# Patient Record
Sex: FEMALE | Race: WHITE | NOT HISPANIC OR LATINO | Employment: UNEMPLOYED | URBAN - METROPOLITAN AREA
[De-identification: names, ages, dates, MRNs, and addresses within clinical notes are randomized per-mention and may not be internally consistent; named-entity substitution may affect disease eponyms.]

---

## 2022-06-03 ENCOUNTER — TELEPHONE (OUTPATIENT)
Dept: BEHAVIORAL HEALTH | Facility: CLINIC | Age: 32
End: 2022-06-03

## 2022-06-03 ENCOUNTER — HOSPITAL ENCOUNTER (INPATIENT)
Facility: CLINIC | Age: 32
LOS: 2 days | Discharge: PSYCHIATRIC OR SUBSTANCE USE FACILITY | DRG: 897 | End: 2022-06-05
Attending: EMERGENCY MEDICINE | Admitting: PSYCHIATRY & NEUROLOGY
Payer: COMMERCIAL

## 2022-06-03 DIAGNOSIS — Z20.822 CONTACT WITH AND (SUSPECTED) EXPOSURE TO COVID-19: ICD-10-CM

## 2022-06-03 DIAGNOSIS — F33.0 MILD EPISODE OF RECURRENT MAJOR DEPRESSIVE DISORDER (H): Primary | ICD-10-CM

## 2022-06-03 DIAGNOSIS — F10.220 ACUTE ALCOHOLIC INTOXICATION IN ALCOHOLISM WITHOUT COMPLICATION (H): ICD-10-CM

## 2022-06-03 DIAGNOSIS — F10.239 ALCOHOL DEPENDENCE WITH WITHDRAWAL WITH COMPLICATION (H): ICD-10-CM

## 2022-06-03 LAB
ALBUMIN SERPL-MCNC: 3.5 G/DL (ref 3.4–5)
ALCOHOL BREATH TEST: 0.41 (ref 0–0.01)
ALP SERPL-CCNC: 72 U/L (ref 40–150)
ALT SERPL W P-5'-P-CCNC: 23 U/L (ref 0–50)
AMPHETAMINES UR QL SCN: NORMAL
ANION GAP SERPL CALCULATED.3IONS-SCNC: 9 MMOL/L (ref 3–14)
AST SERPL W P-5'-P-CCNC: 19 U/L (ref 0–45)
BARBITURATES UR QL: NORMAL
BASOPHILS # BLD AUTO: 0.1 10E3/UL (ref 0–0.2)
BASOPHILS NFR BLD AUTO: 1 %
BENZODIAZ UR QL: NORMAL
BILIRUB SERPL-MCNC: 0.1 MG/DL (ref 0.2–1.3)
BUN SERPL-MCNC: 8 MG/DL (ref 7–30)
CALCIUM SERPL-MCNC: 7.9 MG/DL (ref 8.5–10.1)
CANNABINOIDS UR QL SCN: NORMAL
CHLORIDE BLD-SCNC: 108 MMOL/L (ref 94–109)
CO2 SERPL-SCNC: 26 MMOL/L (ref 20–32)
COCAINE UR QL: NORMAL
CREAT SERPL-MCNC: 0.47 MG/DL (ref 0.52–1.04)
EOSINOPHIL # BLD AUTO: 0 10E3/UL (ref 0–0.7)
EOSINOPHIL NFR BLD AUTO: 0 %
ERYTHROCYTE [DISTWIDTH] IN BLOOD BY AUTOMATED COUNT: 11.9 % (ref 10–15)
GFR SERPL CREATININE-BSD FRML MDRD: >90 ML/MIN/1.73M2
GLUCOSE BLD-MCNC: 124 MG/DL (ref 70–99)
HCG SERPL QL: NEGATIVE
HCG UR QL: NEGATIVE
HCT VFR BLD AUTO: 35.7 % (ref 35–47)
HGB BLD-MCNC: 12.4 G/DL (ref 11.7–15.7)
IMM GRANULOCYTES # BLD: 0 10E3/UL
IMM GRANULOCYTES NFR BLD: 1 %
LYMPHOCYTES # BLD AUTO: 1.8 10E3/UL (ref 0.8–5.3)
LYMPHOCYTES NFR BLD AUTO: 21 %
MCH RBC QN AUTO: 31.4 PG (ref 26.5–33)
MCHC RBC AUTO-ENTMCNC: 34.7 G/DL (ref 31.5–36.5)
MCV RBC AUTO: 90 FL (ref 78–100)
MONOCYTES # BLD AUTO: 0.4 10E3/UL (ref 0–1.3)
MONOCYTES NFR BLD AUTO: 5 %
NEUTROPHILS # BLD AUTO: 6.4 10E3/UL (ref 1.6–8.3)
NEUTROPHILS NFR BLD AUTO: 72 %
NRBC # BLD AUTO: 0 10E3/UL
NRBC BLD AUTO-RTO: 0 /100
OPIATES UR QL SCN: NORMAL
PLATELET # BLD AUTO: 307 10E3/UL (ref 150–450)
POTASSIUM BLD-SCNC: 2.9 MMOL/L (ref 3.4–5.3)
PROT SERPL-MCNC: 6.9 G/DL (ref 6.8–8.8)
RBC # BLD AUTO: 3.95 10E6/UL (ref 3.8–5.2)
SARS-COV-2 RNA RESP QL NAA+PROBE: NEGATIVE
SODIUM SERPL-SCNC: 143 MMOL/L (ref 133–144)
WBC # BLD AUTO: 8.8 10E3/UL (ref 4–11)

## 2022-06-03 PROCEDURE — 250N000013 HC RX MED GY IP 250 OP 250 PS 637: Performed by: EMERGENCY MEDICINE

## 2022-06-03 PROCEDURE — 87635 SARS-COV-2 COVID-19 AMP PRB: CPT | Performed by: EMERGENCY MEDICINE

## 2022-06-03 PROCEDURE — 84703 CHORIONIC GONADOTROPIN ASSAY: CPT | Performed by: EMERGENCY MEDICINE

## 2022-06-03 PROCEDURE — C9803 HOPD COVID-19 SPEC COLLECT: HCPCS | Performed by: EMERGENCY MEDICINE

## 2022-06-03 PROCEDURE — 80307 DRUG TEST PRSMV CHEM ANLYZR: CPT | Performed by: EMERGENCY MEDICINE

## 2022-06-03 PROCEDURE — 128N000004 HC R&B CD ADULT

## 2022-06-03 PROCEDURE — 82075 ASSAY OF BREATH ETHANOL: CPT | Performed by: EMERGENCY MEDICINE

## 2022-06-03 PROCEDURE — 99284 EMERGENCY DEPT VISIT MOD MDM: CPT | Performed by: EMERGENCY MEDICINE

## 2022-06-03 PROCEDURE — 250N000013 HC RX MED GY IP 250 OP 250 PS 637: Performed by: PSYCHIATRY & NEUROLOGY

## 2022-06-03 PROCEDURE — 80053 COMPREHEN METABOLIC PANEL: CPT | Performed by: EMERGENCY MEDICINE

## 2022-06-03 PROCEDURE — 99285 EMERGENCY DEPT VISIT HI MDM: CPT | Performed by: EMERGENCY MEDICINE

## 2022-06-03 PROCEDURE — HZ2ZZZZ DETOXIFICATION SERVICES FOR SUBSTANCE ABUSE TREATMENT: ICD-10-PCS | Performed by: EMERGENCY MEDICINE

## 2022-06-03 PROCEDURE — 36415 COLL VENOUS BLD VENIPUNCTURE: CPT | Performed by: EMERGENCY MEDICINE

## 2022-06-03 PROCEDURE — 85025 COMPLETE CBC W/AUTO DIFF WBC: CPT | Performed by: EMERGENCY MEDICINE

## 2022-06-03 PROCEDURE — 81025 URINE PREGNANCY TEST: CPT | Performed by: EMERGENCY MEDICINE

## 2022-06-03 RX ORDER — ACETAMINOPHEN 325 MG/1
650 TABLET ORAL EVERY 4 HOURS PRN
Status: DISCONTINUED | OUTPATIENT
Start: 2022-06-03 | End: 2022-06-05 | Stop reason: HOSPADM

## 2022-06-03 RX ORDER — BUPROPION HYDROCHLORIDE 150 MG/1
1 TABLET ORAL DAILY
COMMUNITY
Start: 2022-04-29

## 2022-06-03 RX ORDER — ONDANSETRON 4 MG/1
4 TABLET, ORALLY DISINTEGRATING ORAL EVERY 6 HOURS PRN
Status: DISCONTINUED | OUTPATIENT
Start: 2022-06-03 | End: 2022-06-05 | Stop reason: HOSPADM

## 2022-06-03 RX ORDER — HYDROXYZINE HYDROCHLORIDE 25 MG/1
25 TABLET, FILM COATED ORAL EVERY 4 HOURS PRN
Status: DISCONTINUED | OUTPATIENT
Start: 2022-06-03 | End: 2022-06-05 | Stop reason: HOSPADM

## 2022-06-03 RX ORDER — LOPERAMIDE HCL 2 MG
2 CAPSULE ORAL 4 TIMES DAILY PRN
Status: DISCONTINUED | OUTPATIENT
Start: 2022-06-03 | End: 2022-06-05 | Stop reason: HOSPADM

## 2022-06-03 RX ORDER — FOLIC ACID 1 MG/1
1 TABLET ORAL DAILY
Status: DISCONTINUED | OUTPATIENT
Start: 2022-06-04 | End: 2022-06-04

## 2022-06-03 RX ORDER — LORAZEPAM 1 MG/1
1-4 TABLET ORAL EVERY 30 MIN PRN
Status: DISCONTINUED | OUTPATIENT
Start: 2022-06-03 | End: 2022-06-03

## 2022-06-03 RX ORDER — DIAZEPAM 5 MG
5-20 TABLET ORAL EVERY 30 MIN PRN
Status: DISCONTINUED | OUTPATIENT
Start: 2022-06-03 | End: 2022-06-05 | Stop reason: HOSPADM

## 2022-06-03 RX ORDER — MAGNESIUM HYDROXIDE/ALUMINUM HYDROXICE/SIMETHICONE 120; 1200; 1200 MG/30ML; MG/30ML; MG/30ML
30 SUSPENSION ORAL EVERY 4 HOURS PRN
Status: DISCONTINUED | OUTPATIENT
Start: 2022-06-03 | End: 2022-06-05 | Stop reason: HOSPADM

## 2022-06-03 RX ORDER — MULTIPLE VITAMINS W/ MINERALS TAB 9MG-400MCG
1 TAB ORAL DAILY
Status: DISCONTINUED | OUTPATIENT
Start: 2022-06-04 | End: 2022-06-04

## 2022-06-03 RX ORDER — POTASSIUM CHLORIDE 1.5 G/1.58G
60 POWDER, FOR SOLUTION ORAL ONCE
Status: COMPLETED | OUTPATIENT
Start: 2022-06-03 | End: 2022-06-03

## 2022-06-03 RX ORDER — ATENOLOL 50 MG/1
50 TABLET ORAL DAILY PRN
Status: DISCONTINUED | OUTPATIENT
Start: 2022-06-03 | End: 2022-06-05 | Stop reason: HOSPADM

## 2022-06-03 RX ORDER — MULTIPLE VITAMINS W/ MINERALS TAB 9MG-400MCG
1 TAB ORAL DAILY
Status: DISCONTINUED | OUTPATIENT
Start: 2022-06-03 | End: 2022-06-05 | Stop reason: HOSPADM

## 2022-06-03 RX ORDER — DOXEPIN 6 MG/1
TABLET, FILM COATED ORAL
Status: ON HOLD | COMMUNITY
Start: 2022-05-05 | End: 2022-06-05

## 2022-06-03 RX ORDER — TRAZODONE HYDROCHLORIDE 50 MG/1
50 TABLET, FILM COATED ORAL
Status: DISCONTINUED | OUTPATIENT
Start: 2022-06-03 | End: 2022-06-05 | Stop reason: HOSPADM

## 2022-06-03 RX ORDER — FOLIC ACID 1 MG/1
1 TABLET ORAL DAILY
Status: DISCONTINUED | OUTPATIENT
Start: 2022-06-03 | End: 2022-06-05 | Stop reason: HOSPADM

## 2022-06-03 RX ORDER — AMOXICILLIN 250 MG
1 CAPSULE ORAL 2 TIMES DAILY PRN
Status: DISCONTINUED | OUTPATIENT
Start: 2022-06-03 | End: 2022-06-05 | Stop reason: HOSPADM

## 2022-06-03 RX ADMIN — FOLIC ACID 1 MG: 1 TABLET ORAL at 16:21

## 2022-06-03 RX ADMIN — DIAZEPAM 10 MG: 5 TABLET ORAL at 22:04

## 2022-06-03 RX ADMIN — MULTIPLE VITAMINS W/ MINERALS TAB 1 TABLET: TAB at 16:21

## 2022-06-03 RX ADMIN — THIAMINE HCL TAB 100 MG 100 MG: 100 TAB at 16:21

## 2022-06-03 RX ADMIN — POTASSIUM CHLORIDE 60 MEQ: 1.5 POWDER, FOR SOLUTION ORAL at 17:48

## 2022-06-03 RX ADMIN — LORAZEPAM 1 MG: 1 TABLET ORAL at 19:13

## 2022-06-03 RX ADMIN — LORAZEPAM 1 MG: 1 TABLET ORAL at 16:33

## 2022-06-03 ASSESSMENT — ENCOUNTER SYMPTOMS
FLANK PAIN: 0
NAUSEA: 0
FEVER: 0
MUSCULOSKELETAL NEGATIVE: 1
SHORTNESS OF BREATH: 0
COUGH: 0
PSYCHIATRIC NEGATIVE: 1
CHILLS: 0
HEADACHES: 0
ABDOMINAL PAIN: 0
EYES NEGATIVE: 1
VOMITING: 0

## 2022-06-03 ASSESSMENT — ACTIVITIES OF DAILY LIVING (ADL)
LAUNDRY: WITH SUPERVISION
HYGIENE/GROOMING: INDEPENDENT
DRESS: INDEPENDENT
ORAL_HYGIENE: INDEPENDENT

## 2022-06-03 ASSESSMENT — PATIENT HEALTH QUESTIONNAIRE - PHQ9: SUM OF ALL RESPONSES TO PHQ9 QUESTIONS 1 & 2: 5

## 2022-06-03 NOTE — ED TRIAGE NOTES
Pt is looking to go to the [facility], which is similar to a rehab facility but pt needs to be medically cleared and needs to be detox'ed from alcohol prior to being able to get into the [facility] facility. Pt denies any withdrawal seizures. Denies taking any medications.     Triage Assessment     Row Name 06/03/22 4058       Triage Assessment (Adult)    Airway WDL WDL       Respiratory WDL    Respiratory WDL WDL       Skin Circulation/Temperature WDL    Skin Circulation/Temperature WDL WDL       Cardiac WDL    Cardiac WDL WDL       Peripheral/Neurovascular WDL    Peripheral Neurovascular WDL WDL       Cognitive/Neuro/Behavioral WDL    Cognitive/Neuro/Behavioral WDL WDL

## 2022-06-04 LAB
CHOLEST SERPL-MCNC: 191 MG/DL
GGT SERPL-CCNC: 17 U/L (ref 0–40)
HDLC SERPL-MCNC: 62 MG/DL
LDLC SERPL CALC-MCNC: 100 MG/DL
NONHDLC SERPL-MCNC: 129 MG/DL
POTASSIUM BLD-SCNC: 3.2 MMOL/L (ref 3.4–5.3)
TRIGL SERPL-MCNC: 146 MG/DL
TSH SERPL DL<=0.005 MIU/L-ACNC: 2.67 MU/L (ref 0.4–4)

## 2022-06-04 PROCEDURE — 250N000013 HC RX MED GY IP 250 OP 250 PS 637: Performed by: EMERGENCY MEDICINE

## 2022-06-04 PROCEDURE — 250N000013 HC RX MED GY IP 250 OP 250 PS 637: Performed by: PHYSICIAN ASSISTANT

## 2022-06-04 PROCEDURE — 36415 COLL VENOUS BLD VENIPUNCTURE: CPT | Performed by: PSYCHIATRY & NEUROLOGY

## 2022-06-04 PROCEDURE — 128N000004 HC R&B CD ADULT

## 2022-06-04 PROCEDURE — 82977 ASSAY OF GGT: CPT | Performed by: PSYCHIATRY & NEUROLOGY

## 2022-06-04 PROCEDURE — 80061 LIPID PANEL: CPT | Performed by: PSYCHIATRY & NEUROLOGY

## 2022-06-04 PROCEDURE — 99231 SBSQ HOSP IP/OBS SF/LOW 25: CPT | Performed by: PHYSICIAN ASSISTANT

## 2022-06-04 PROCEDURE — H2032 ACTIVITY THERAPY, PER 15 MIN: HCPCS

## 2022-06-04 PROCEDURE — 84132 ASSAY OF SERUM POTASSIUM: CPT | Performed by: PSYCHIATRY & NEUROLOGY

## 2022-06-04 PROCEDURE — 250N000013 HC RX MED GY IP 250 OP 250 PS 637: Performed by: PSYCHIATRY & NEUROLOGY

## 2022-06-04 PROCEDURE — 99223 1ST HOSP IP/OBS HIGH 75: CPT | Mod: AI | Performed by: PSYCHIATRY & NEUROLOGY

## 2022-06-04 PROCEDURE — 84443 ASSAY THYROID STIM HORMONE: CPT | Performed by: PSYCHIATRY & NEUROLOGY

## 2022-06-04 RX ORDER — POTASSIUM CHLORIDE 1.5 G/1.58G
40 POWDER, FOR SOLUTION ORAL ONCE
Status: COMPLETED | OUTPATIENT
Start: 2022-06-04 | End: 2022-06-04

## 2022-06-04 RX ORDER — DOXEPIN 3 MG/1
6 TABLET, FILM COATED ORAL AT BEDTIME
Status: DISCONTINUED | OUTPATIENT
Start: 2022-06-04 | End: 2022-06-05 | Stop reason: HOSPADM

## 2022-06-04 RX ORDER — POTASSIUM CHLORIDE 1.5 G/1.58G
60 POWDER, FOR SOLUTION ORAL ONCE
Status: DISCONTINUED | OUTPATIENT
Start: 2022-06-04 | End: 2022-06-04

## 2022-06-04 RX ORDER — DOXEPIN 6 MG/1
6 TABLET, FILM COATED ORAL AT BEDTIME
Status: DISCONTINUED | OUTPATIENT
Start: 2022-06-04 | End: 2022-06-04

## 2022-06-04 RX ORDER — BUPROPION HYDROCHLORIDE 150 MG/1
150 TABLET ORAL DAILY
Status: DISCONTINUED | OUTPATIENT
Start: 2022-06-04 | End: 2022-06-05

## 2022-06-04 RX ADMIN — MULTIPLE VITAMINS W/ MINERALS TAB 1 TABLET: TAB at 12:06

## 2022-06-04 RX ADMIN — POTASSIUM CHLORIDE 40 MEQ: 1.5 FOR SOLUTION ORAL at 13:06

## 2022-06-04 RX ADMIN — FOLIC ACID 1 MG: 1 TABLET ORAL at 12:06

## 2022-06-04 RX ADMIN — DIAZEPAM 10 MG: 5 TABLET ORAL at 00:38

## 2022-06-04 RX ADMIN — DOXEPIN HYDROCHLORIDE 6 MG: 3 TABLET ORAL at 21:49

## 2022-06-04 RX ADMIN — SERTRALINE HYDROCHLORIDE 50 MG: 50 TABLET ORAL at 12:06

## 2022-06-04 RX ADMIN — THIAMINE HCL TAB 100 MG 100 MG: 100 TAB at 12:06

## 2022-06-04 ASSESSMENT — ACTIVITIES OF DAILY LIVING (ADL)
HYGIENE/GROOMING: INDEPENDENT
ORAL_HYGIENE: INDEPENDENT
LAUNDRY: WITH SUPERVISION
DRESS: INDEPENDENT

## 2022-06-04 NOTE — PLAN OF CARE
06/04/22 1800   Group Therapy Session   Group Attendance attended group session   Time Session Began 1645   Time Session Ended 1730   Total Time patient participated (minutes) 45   Total # Attendees 7   Group Type expressive therapy  (art therapy)   Group Topic Covered structured socialization   Group Session Detail collaborative art   Patient Response/Contribution cooperative with task;organized   Patient Response Detail Kodi presented as calm and cooperative. She engaged in group discussion about social engagement and community. She participated in making collaborative artwork and contributed positive imagery to peers' art. She interacted appropriately with peers, sharing supplies and using humor.   Goal Outcome Evaluation:

## 2022-06-04 NOTE — CONSULTS
"[facility]  Consult Note - Hospitalist Service  Date of Admission:  6/3/2022  Consult Requested by: Evan Multani MD  Reason for Consult: Co-management of alcohol withdrawal    Assessment & Plan   Kodi Dumont is a 32 year old female admitted on 6/3/2022. She has a PMH significant for alcohol use disorder. She denies a history of other medical problems.     Per ED note 6/3/22: Patient reports that she went to \"[facility] facility today for detox from alcohol but was told to come to the ED today to detox.  She states that she has been drinking about 7 \"nips\" a day.  She says that her last drink was in the bathroom here in the ED.  She adds that she used to live in a sober living facility but no longer lives there anymore.  Patient denies suicidal ideation.  Patient denies history of alcohol withdrawal seizures.  She denies other substance use.  Social: She used to live in sober living.      Acute alcohol withdrawal  Alcohol use disorder  -Treatment per primary team    Hypokalemia, mild  Likely 2/2 EtOH use. K 2.9 on admission, improved to 3.2 this AM after 60mEq KCl given in ED.  -KCl 40mEq PO x1  -No need to recheck/monitor further.       The patient's care was discussed with the Patient.    Primary team will be notified of the above via this note. Medicine will sign off at this time.     Thank you for involving the hospital medicine service in the care of this patient. Please do not hesitate to contact us with an questions or concerns.      JENS NEWMAN PA  [facility]  Securely message with the Vocera Web Console (learn more here)  Text page via [email or web address] Paging/Directory       Hospitalist Service    Clinically Significant Risk Factors Present on Admission          # Hypocalcemia: Ca = 7.9 mg/dL (Ref range: 8.5 - 10.1 mg/dL) and/or iCa = N/A on admission, will replace as needed         " "    ______________________________________________________________________    Chief Complaint   Alcohol withdrawal    History is obtained from the patient and electronic health record    History of Present Illness   Kodi Dumont is a 32 year old female who has a PMH significant for alcohol use disorder. She denies a history of other medical problems. She denies other problems or concerns at this time. She has no questions.    Per ED note 6/3/22: Patient reports that she went to \"[facility]\" facility today for detox from alcohol but was told to come to the ED today to detox.  She states that she has been drinking about 7 \"nips\" a day.  She says that her last drink was in the bathroom here in the ED.  She adds that she used to live in a sober living facility but no longer lives there anymore.  Patient denies suicidal ideation.  Patient denies history of alcohol withdrawal seizures.  She denies other substance use.  Social: She used to live in sober living.      Review of Systems   The 10 point Review of Systems is negative other than noted in the HPI or here.     Past Medical History    I have reviewed this patient's medical history and updated it with pertinent information if needed.   History reviewed. No pertinent past medical history.    Past Surgical History   I have reviewed this patient's surgical history and updated it with pertinent information if needed.  History reviewed. No pertinent surgical history.    Social History   I have reviewed this patient's social history and updated it with pertinent information if needed.  Social History     Tobacco Use     Smoking status: Never Smoker     Smokeless tobacco: Never Used   Substance Use Topics     Alcohol use: Yes     Comment: 5 shots of vodka daily     Drug use: Never       Family History         Medications   Medications Prior to Admission   Medication Sig Dispense Refill Last Dose     buPROPion (WELLBUTRIN XL) 150 MG 24 hr tablet Take 1 tablet by mouth " daily   Past Week at 1.5 weeks ago     doxepin (SILENOR) 6 MG tablet TAKE 1/2 TO 1 TABLETS BY MOUTH DAILY AT BEDTIME   Past Week at 1.5 weeks ago     sertraline (ZOLOFT) 50 MG tablet Take 1 tablet by mouth daily   Past Week at 1.5 weeks ago       Allergies   No Known Allergies    Physical Exam   Vital Signs: Temp: 97.6  F (36.4  C) Temp src: Temporal BP: 111/76 Pulse: 73   Resp: 16 SpO2: 98 % O2 Device: None (Room air)    Weight: 105 lbs 0 oz    General: Nontoxic appearance, no acute distress   Eyes: PERRL, EOMI, sclerae nonicteric   Respiratory: Clear throughout, no respiratory distress   Cardiovascular: RRR, no murmurs  Skin: Warm, dry, no pallor, no rash   Musculoskeletal: Moves all extremities equally  Neurologic: Alert and oriented x 4, CN II-XII observed to be grossly intact, no focal weakness, speech normal, gait steady  Psychiatric: Blunt affect. Thoughts logical.      Data   Results for orders placed or performed during the hospital encounter of 06/03/22 (from the past 24 hour(s))   Alcohol breath test POCT   Result Value Ref Range    Alcohol Breath Test 0.412 (A) 0.00 - 0.01   Urine Drugs of Abuse Screen    Narrative    The following orders were created for panel order Urine Drugs of Abuse Screen.  Procedure                               Abnormality         Status                     ---------                               -----------         ------                     Drug abuse screen 1 urin...[[id number]]  Normal              Final result                 Please view results for these tests on the individual orders.   HCG qualitative urine   Result Value Ref Range    hCG Urine Qualitative Negative Negative   Drug abuse screen 1 urine (ED)   Result Value Ref Range    Amphetamines Urine Screen Negative Screen Negative    Barbiturates Urine Screen Negative Screen Negative    Benzodiazepines Urine Screen Negative Screen Negative    Cannabinoids Urine Screen Negative Screen Negative    Cocaine Urine Screen  Negative Screen Negative    Opiates Urine Screen Negative Screen Negative   CBC with platelets differential    Narrative    The following orders were created for panel order CBC with platelets differential.  Procedure                               Abnormality         Status                     ---------                               -----------         ------                     CBC with platelets and d...[[id number]]                      Final result                 Please view results for these tests on the individual orders.   Comprehensive metabolic panel   Result Value Ref Range    Sodium 143 133 - 144 mmol/L    Potassium 2.9 (L) 3.4 - 5.3 mmol/L    Chloride 108 94 - 109 mmol/L    Carbon Dioxide (CO2) 26 20 - 32 mmol/L    Anion Gap 9 3 - 14 mmol/L    Urea Nitrogen 8 7 - 30 mg/dL    Creatinine 0.47 (L) 0.52 - 1.04 mg/dL    Calcium 7.9 (L) 8.5 - 10.1 mg/dL    Glucose 124 (H) 70 - 99 mg/dL    Alkaline Phosphatase 72 40 - 150 U/L    AST 19 0 - 45 U/L    ALT 23 0 - 50 U/L    Protein Total 6.9 6.8 - 8.8 g/dL    Albumin 3.5 3.4 - 5.0 g/dL    Bilirubin Total 0.1 (L) 0.2 - 1.3 mg/dL    GFR Estimate >90 >60 mL/min/1.73m2   HCG qualitative pregnancy (blood)   Result Value Ref Range    hCG Serum Qualitative Negative Negative   CBC with platelets and differential   Result Value Ref Range    WBC Count 8.8 4.0 - 11.0 10e3/uL    RBC Count 3.95 3.80 - 5.20 10e6/uL    Hemoglobin 12.4 11.7 - 15.7 g/dL    Hematocrit 35.7 35.0 - 47.0 %    MCV 90 78 - 100 fL    MCH 31.4 26.5 - 33.0 pg    MCHC 34.7 31.5 - 36.5 g/dL    RDW 11.9 10.0 - 15.0 %    Platelet Count 307 150 - 450 10e3/uL    % Neutrophils 72 %    % Lymphocytes 21 %    % Monocytes 5 %    % Eosinophils 0 %    % Basophils 1 %    % Immature Granulocytes 1 %    NRBCs per 100 WBC 0 <1 /100    Absolute Neutrophils 6.4 1.6 - 8.3 10e3/uL    Absolute Lymphocytes 1.8 0.8 - 5.3 10e3/uL    Absolute Monocytes 0.4 0.0 - 1.3 10e3/uL    Absolute Eosinophils 0.0 0.0 - 0.7 10e3/uL    Absolute  Basophils 0.1 0.0 - 0.2 10e3/uL    Absolute Immature Granulocytes 0.0 <=0.4 10e3/uL    Absolute NRBCs 0.0 10e3/uL   Asymptomatic COVID-19 Virus (Coronavirus) by PCR Nose    Specimen: Nose; Swab   Result Value Ref Range    SARS CoV2 PCR Negative Negative    Narrative    Testing was performed using the ya  SARS-CoV-2 & Influenza A/B Assay on the ya  [employer]  System.  This test should be ordered for the detection of SARS-COV-2 in individuals who meet SARS-CoV-2 clinical and/or epidemiological criteria. Test performance is unknown in asymptomatic patients.  This test is for in vitro diagnostic use under the FDA EUA for laboratories certified under CLIA to perform moderate and/or high complexity testing. This test has not been FDA cleared or approved.  A negative test does not rule out the presence of PCR inhibitors in the specimen or target RNA in concentration below the limit of detection for the assay. The possibility of a false negative should be considered if the patient's recent exposure or clinical presentation suggests COVID-19.  [facility] are certified under the Clinical Laboratory Improvement Amendments of 1988 (CLIA-88) as qualified to perform moderate and/or high complexity laboratory testing.   GGT   Result Value Ref Range    GGT 17 0 - 40 U/L   Lipid panel   Result Value Ref Range    Cholesterol 191 <200 mg/dL    Triglycerides 146 <150 mg/dL    Direct Measure HDL 62 >=50 mg/dL    LDL Cholesterol Calculated 100 <=100 mg/dL    Non HDL Cholesterol 129 <130 mg/dL    Narrative    Cholesterol  Desirable:  <200 mg/dL    Triglycerides  Normal:  Less than 150 mg/dL  Borderline High:  150-199 mg/dL  High:  200-499 mg/dL  Very High:  Greater than or equal to 500 mg/dL    Direct Measure HDL  Female:  Greater than or equal to 50 mg/dL   Male:  Greater than or equal to 40 mg/dL    LDL Cholesterol  Desirable:  <100mg/dL  Above Desirable:  100-129 mg/dL   Borderline High:  130-159 mg/dL   High:   160-189 mg/dL   Very High:  >= 190 mg/dL    Non HDL Cholesterol  Desirable:  130 mg/dL  Above Desirable:  130-159 mg/dL  Borderline High:  160-189 mg/dL  High:  190-219 mg/dL  Very High:  Greater than or equal to 220 mg/dL   TSH with free T4 reflex and/or T3 as indicated   Result Value Ref Range    TSH 2.67 0.40 - 4.00 mU/L   Potassium   Result Value Ref Range    Potassium 3.2 (L) 3.4 - 5.3 mmol/L

## 2022-06-04 NOTE — TELEPHONE ENCOUNTER
S: 6:54pm- ED MD called to request detox inpt for 32 yrs old female in the [facility] ED.     B: Pt presents to the ED seeking detox from alcohol.  Pt reports drinking 7 nips a day/10 shots of vodka daily for the last month.  Pt denies a hx of w/d seizures and DTs.  Pt denies SI.  Pt is getting Ativan and is on MSSA protocol.  Pt went to  [facility]ility today and was told she needs to go to detox.  Pt s last drink was in the bathroom in the ER.  She denies other substance use.  She is not taking any medications.     Pt denies any chronic medical illness.  She is ambulatory and is medically cleared.     COVID: negative  UTOX: negative  CMP: Potassium 2.9, replaced in ED.  CBC: WNL  HCG: negative  BREATHALYZER: 0.412 @ 1:57pm.     A: Vol.    R: Patient cleared and ready for behavioral bed placement: Yes     8pm- Dr. Multani accepts for 3A/[facility].  CD Admit.

## 2022-06-04 NOTE — PLAN OF CARE
Problem: Plan of Care - These are the overarching goals to be used throughout the patient stay.    Goal: Optimal Comfort and Wellbeing  Outcome: Ongoing, Progressing     Problem: Behavioral Health Plan of Care  Goal: Optimal Comfort and Wellbeing  Outcome: Ongoing, Progressing    Behavioral  Pt appeared sleeping comfortably overnight; breathing was even and unlabored.      Medical  Pt continues in alcohol withdrawal; MSSA 8 and 4; 10 x 1 of valium given this shift; Pt has received a total of 20 mg of valium and 2 mg of ativan since arrival to the hospital. Pt last valium 6/4 at 0038.  No new medical concerns noted.

## 2022-06-04 NOTE — ED NOTES
ED to Behavioral Floor Handoff    SITUATION  Kodi Dumont is a 32 year old female who speaks English and lives home status is unknown unknown The patient arrived in the ED by private car from sober facility with a complaint of Alcohol Intoxication (Looking for detox, 10 shots of vodka daily. Last drink 1.5 hours ago. Denies any withdrawal seizures.)  .The patient's current symptoms started/worsened 1 day(s) ago and during this time the symptoms have remained the same.   In the ED, pt was diagnosed with   Final diagnoses:   Acute alcoholic intoxication in alcoholism without complication (H)        Initial vitals were: BP: 117/78  Pulse: 101  Temp: 98.4  F (36.9  C)  Resp: 16  Height: 152.4 cm (5')  Weight: 47.6 kg (105 lb)  SpO2: 95 %   --------  Is the patient diabetic? No   If yes, last blood glucose? --     If yes, was this treated in the ED? --  --------  Is the patient inebriated (ETOH) Yes or Impaired on other substances? No  MSSA done? Yes  Last MSSA score: --    Were withdrawal symptoms treated? Yes  Does the patient have a seizure history? Yes. If yes, date of most recent seizure--  --------  Is the patient patient experiencing suicidal ideation? denies current or recent suicidal ideation     Homicidal ideation? denies current or recent homicidal ideation or behaviors.    Self-injurious behavior/urges? denies current or recent self injurious behavior or ideation.  ------  Was pt aggressive in the ED No  Was a code called No  Is the pt now cooperative? Yes  -------  Meds given in ED:   Medications   LORazepam (ATIVAN) tablet 1-4 mg (1 mg Oral Given 6/3/22 1913)   thiamine (B-1) tablet 100 mg (100 mg Oral Given 6/3/22 1621)   folic acid (FOLVITE) tablet 1 mg (1 mg Oral Given 6/3/22 1621)   multivitamin w/minerals (THERA-VIT-M) tablet 1 tablet (1 tablet Oral Given 6/3/22 1621)   potassium chloride (KLOR-CON) Packet 60 mEq (60 mEq Oral Given 6/3/22 1748)      Family present during ED course? No  Family  currently present? No    BACKGROUND  Does the patient have a cognitive impairment or developmental disability? No  Allergies: No Known Allergies.   Social demographics are   Social History     Socioeconomic History     Marital status: Single     Spouse name: None     Number of children: None     Years of education: None     Highest education level: None   Tobacco Use     Smoking status: Never Smoker     Smokeless tobacco: Never Used   Substance and Sexual Activity     Alcohol use: Yes     Comment: 5 shots of vodka daily     Drug use: Never     Sexual activity: Not Currently        ASSESSMENT  Labs results   Labs Ordered and Resulted from Time of ED Arrival to Time of ED Departure   COMPREHENSIVE METABOLIC PANEL - Abnormal       Result Value    Sodium 143      Potassium 2.9 (*)     Chloride 108      Carbon Dioxide (CO2) 26      Anion Gap 9      Urea Nitrogen 8      Creatinine 0.47 (*)     Calcium 7.9 (*)     Glucose 124 (*)     Alkaline Phosphatase 72      AST 19      ALT 23      Protein Total 6.9      Albumin 3.5      Bilirubin Total 0.1 (*)     GFR Estimate >90     ALCOHOL BREATH TEST POCT - Abnormal    Alcohol Breath Test 0.412 (*)    HCG QUALITATIVE URINE - Normal    hCG Urine Qualitative Negative     HCG QUALITATIVE PREGNANCY - Normal    hCG Serum Qualitative Negative     COVID-19 VIRUS (CORONAVIRUS) BY PCR - Normal    SARS CoV2 PCR Negative     DRUG ABUSE SCREEN 1 URINE (ED) - Normal    Amphetamines Urine Screen Negative      Barbiturates Urine Screen Negative      Benzodiazepines Urine Screen Negative      Cannabinoids Urine Screen Negative      Cocaine Urine Screen Negative      Opiates Urine Screen Negative     CBC WITH PLATELETS AND DIFFERENTIAL    WBC Count 8.8      RBC Count 3.95      Hemoglobin 12.4      Hematocrit 35.7      MCV 90      MCH 31.4      MCHC 34.7      RDW 11.9      Platelet Count 307      % Neutrophils 72      % Lymphocytes 21      % Monocytes 5      % Eosinophils 0      % Basophils 1       % Immature Granulocytes 1      NRBCs per 100 WBC 0      Absolute Neutrophils 6.4      Absolute Lymphocytes 1.8      Absolute Monocytes 0.4      Absolute Eosinophils 0.0      Absolute Basophils 0.1      Absolute Immature Granulocytes 0.0      Absolute NRBCs 0.0        Imaging Studies: No results found for this or any previous visit (from the past 24 hour(s)).   Most recent vital signs /83 (BP Location: Left arm)   Pulse 102   Temp 98.4  F (36.9  C) (Oral)   Resp 16   Ht 1.524 m (5')   Wt 47.6 kg (105 lb)   LMP 05/15/2022   SpO2 94%   BMI 20.51 kg/m     Abnormal labs/tests/findings requiring intervention:---   Pain control: pt had none  Nausea control: pt had none    RECOMMENDATION  Are any infection precautions needed (MRSA, VRE, etc.)? No If yes, what infection? --  ---  Does the patient have mobility issues? independently. If yes, what device does the pt use? ---  ---  Is patient on 72 hour hold or commitment? No If on 72 hour hold, have hold and rights been given to patient? No  Are admitting orders written if after 10 p.m. ?No  Tasks needing to be completed:---     Jen Boyce, LATOYA   ascom--    [phone] [facility]   [phone] [facility]

## 2022-06-04 NOTE — DISCHARGE INSTRUCTIONS
Behavioral Discharge Planning and Instructions  THANK YOU FOR CHOOSING THE [facility]  3A  [phone]    Summary: You were admitted to [facility] on 6/3/2022 for detoxification from alcohol.  A medical exam was performed that included lab work. You have met with a  and opted to return to [facility].  Please take care and make your recovery a priority, Kodi!    Recommendation:  Complete treatment and follow all recommendations of [facility].    Main Diagnosis: Per Dr. Ellis MD;    Alcohol Use Disorder, severe, in withdrawal, complicated by hypokalemia  Substance induced mood disorder  Generalized Anxiety Disorder    Major Treatments, Procedures and Findings:  You were detoxed from alcohol with the Modified Selective Severity Protocol using Valium. You have met with a  to develop a treatment plan for discharge.  You have had labs drawn and a copy of those labs will be sent home with you.  Please bring your lab results with to your follow up doctor appointment.    Symptoms to Report:  If you experience more anxiety, confusion, sleeplessness, deep sadness or thoughts of suicide, notify your treatment team or notify your primary care physician. IF ANY OF THE SYMPTOMS YOU ARE EXPERIENCING ARE A MEDICAL EMERGENCY CALL 911 IMMEDIATELY.     Lifestyle Adjustment: Adjust your lifestyle to get enough sleep, relaxation, exercise and  good nutrition. Continue to develop healthy coping skills to decrease stress and promote a sober living environment. Do not use alcohol, illegal drugs or addictive medications other than what is currently prescribed. AA, NA, and  Sponsor are excellent resources for support.     Primary Provider:  Please follow up with your primary care provider within 30 days of discharge.       DISCHARGE RESOURCES:  -SMART Recovery - self management for addiction recovery:  www.smartrecovery.org    -Pathways ~ A Health Crisis Resource & Support Center:  988.902.8609.  -[facility] [phone]   -[facility] [phone] or [phone].  -Suicide Awareness Voices of Education (SAVE) (www.save.org): 827-115-WPZG (4617)  -National Suicide Prevention Line (www.mentalhealthmn.org): 185-235-BJMD (3837)  -National [place] on Mental Illness (www.mn.ramon.org): 912.986.2998 or 701-306-5279.  -Mowd1ztwt: text the word LIFE to 52455 for immediate support and crisis intervention  -Mental Health Consumer/Survivor Network of [place] (www.mhcsn.net): 907.101.2810 or 713-233-6095  -Mental Health Association of [place] (www.mentalhealth.org): 350.666.4380 or 233-503-7954     -Substance Abuse and Mental Health Services (www.samhsa.gov)  -Harm Reduction Coalition (www. Harmreduction.org)  -www.prescribetoprevent.org or http://prescribetoprevent.org/video  -Poison control 8-213-506-1002   **[place] Opioid Prevention Coalition: www.opioidcoalition.org    Sober Support Group Information:  AA/BRAD & Sponsor/Support  -Alcoholics Anonymous (www.alcoholics-anonymous.org): for local information 24 hours/day  -AA Intergroup service office in [place] (http://www.aastpa[place].org/) 381.240.1481  -AA Intergroup service office in [facility]: 804.202.4012. (http://www.aamin[place].org/)  -Narcotics Anonymous (www.nam[place].org) (907) 715-8526   **Sober Fun Activities: www.sober-activities.[place].com/[facility]//[facility] Recovery Connection (MRC)  [facility] connects people seeking recovery to resources that help foster and sustain long-term recovery.  Whether you are seeking resources for treatment, transportation, housing, job training, education, health care or other pathways to recovery, [facility] is a great place to start.    Phone: 950.704.2902.  www.min[place]otarecovery.org (Great listing of all types of recovery and non-recovery related resources)      General Medication Instructions:   See your medication sheet(s) for instructions.   Take all  medicines as directed.  Make no changes unless your doctor suggests them.   Go to all your doctor visits.  Be sure to have all your required lab tests. This way, your medicines can be refilled on time.  Do not use any drugs not prescribed by your provider.  AA/NA and Sponsors are excellent resources for support  Avoid alcohol.    Any follow up concerns:  Nursing questions call the [facility] [phone]  Medical Record call [phone]  Outpatient Behavioral Intake call [phone]  LP+ Wait List/Bed Availability call [phone]    The entire treatment team has appreciated the opportunity to work with you Kodi.  We wish you the best in the future and with your lifelong recovery goals. Please bring this discharge folder with you to all follow up appointments.  It contains your lab results, diagnosis, medication list and discharge recommendations.    THANK YOU FOR CHOOSING THE [facility]

## 2022-06-04 NOTE — PLAN OF CARE
LALITHA Dumont is a 32 year old year old female with a chief complaint of alcohol problem        S = Situation:     Patient a voluntary admission seeking alcohol detox      B  = Background:   Patient admitted from the [facility] seeking alcohol detox. Patient reports drinking 7 small bottles of alcohol, plus 10 shots of vodka daily for 2.5 weeks. Patient denies any additional substance abuse. Patient breathalyzer 0.41 in the ER. Urine drug screen negative, urine pregnancy negative. Patient denies any history of alcohol withdrawal seizures or delirium tremens. Patient reports at least 15 detox admissions at various facilities. Patient denies any inpatient mental health admissions. Patient endorses attending 7 residential CD treatment programs. Patient reports a history of depression, anxiety, and insomnia.    A  =  Assessment:   Patient affect flat, blunted. Mood is anxious. Patient denies SI, SIB, HI, or hallucinations. Patient denies any stressors contributing to relapse. Patient currently resides in sober living by [facility], and plans to return to [facility] for CD treatment and housing. Patient MSSA score a 9 during admission assessment. Was given 10 mg of prn Valium. /82   Pulse 116   Temp 97.8  F (36.6  C) (Temporal)   Resp 16   Ht 1.524 m (5')   Wt 47.6 kg (105 lb)   LMP 05/15/2022   SpO2 95%   BMI 20.51 kg/m       R =   Request or Recommendation:   Patient is on MSSA monitoring for alcohol withdrawal with Valium. Is on withdrawal precautions. Patient to be assessed in am by psychiatrist and internal medicine in am.

## 2022-06-04 NOTE — H&P
Psychiatry History and Physical    Kodi Dumont MRN# [id number]   Age: 32 year old YOB: 1990     Date of Admission:  6/3/2022          Assessment:   This patient is a 32 year old  female with history of depression, anxiety, and alcohol use disorder who presented to ED seeking detox from alcohol. Family history significant for none per patient. Psychosocial stressors include: recent move to [place] for CD treatment, recent relapse, unemployment. Patient was admitted on a voluntary basis to [facility]. Patient was started on MSSA protocol using Valium. Pt  does not have a history of DTs or seizures. Thiamine, folic acid, and multivitamin were ordered on admission. IM consult placed for co-management of care. Symptoms and presentation at this time is most consistent with Alcohol Use Disorder, severe, in withdrawal, complicated by hypokalemia. I have discussed the risks, benefits, and alternatives of Valium and PTA medication regimen. Patient will likely benefit from increased CD supports upon discharge. CTC will be meeting with patient to discuss dispo plan. Will consider anti-craving medications prior to discharge. Inpatient psychiatric hospitalization is warranted at this time for safety, stabilization, and possible adjustment in medications.         Diagnoses:     Alcohol Use Disorder, severe, in withdrawal, complicated by hypokalemia  Substance induced mood disorder  Generalized Anxiety Disorder         Plan:   Psychiatric treatment/inteventions:  Alcohol Withdrawal:  - Continue MSSA protocol using Valium for management of alcohol withdrawal  - Continue thiamine, folate, and multivitamin daily  - Consider anti-craving medications prior to discharge. Pt willing to review additional information about anti-craving medications prior to discharge.  - Continue prn Zofran as needed for nausea   - Withdrawal precautions in place     reviewed    Depression/Anxiety:  - Hold Wellbutrin   "mg daily in context of alcohol withdrawal  - Continue hydroxyzine 25 mg q4h prn for acute anxiety  - Continue Trazodone 50 mg at bedtime prn for sleep disturbances     Patient will be treated in therapeutic milieu with appropriate individual and group therapies as described.    Medical treatment/interventions:  Medical concerns: Alcohol withdrawal  Labs: Reviewed.   Consults: Routine IM consult placed. Appreciate assistance.  Per IM Note dated 6/4/22:  Assessment & Plan     Kodi Dumont is a 32 year old female admitted on 6/3/2022. She has a PMH significant for alcohol use disorder. She denies a history of other medical problems.      Per ED note 6/3/22: Patient reports that she went to \"[facility] facility today for detox from alcohol but was told to come to the ED today to detox.  She states that she has been drinking about 7 \"nips\" a day.  She says that her last drink was in the bathroom here in the ED.  She adds that she used to live in a sober living facility but no longer lives there anymore.  Patient denies suicidal ideation.  Patient denies history of alcohol withdrawal seizures.  She denies other substance use.  Social: She used to live in sober living.     Acute alcohol withdrawal  Alcohol use disorder  -Treatment per primary team     Hypokalemia, mild  Likely 2/2 EtOH use. K 2.9 on admission, improved to 3.2 this AM after 60mEq KCl given in ED.  -KCl 40mEq PO x1  -No need to recheck/monitor further.     The patient's care was discussed with the Patient.     Primary team will be notified of the above via this note. Medicine will sign off at this time.     Thank you for involving the hospital medicine service in the care of this patient. Please do not hesitate to contact us with an questions or concerns.     JENS NEWMAN PA    Legal Status: Voluntary    Safety Assessment:   Checks: Status 15  Pt has not required locked seclusion or restraints in the past 24 hours to maintain safety, please " "refer to RN documentation for further details.    The risks, benefits, alternatives and side effects have been discussed and are understood by the patient.    Disposition Plan   Reason for ongoing admission: requires detoxification from substance that poses a risk of bodily harm during withdrawal period  Discharge location: D. Patient would benefit from increased CD supports.   Discharge Medications: not ordered  Follow-up Appointments: not scheduled  Anticipated length of stay: 2-3 days    Entered by: María Corral MD on 6/4/2022 at 8:45 AM              Chief Complaint:     Alcohol Detox         History of Present Illness:     Per ED Provider Note dated 6/3/22:    The history is provided by the patient and medical records.      Kodi Dumont is a 32 year old female with a past medical history significant for alcohol abuse who presents to the Emergency Department for evaluation of alcohol intoxication and request for detox..  The patient states that she is in alcohol withdrawal.     Patient reports that she went to \"[facility] facility today for detox from alcohol but was told to come to the ED today to detox.  She states that she has been drinking about 7 \"nips\" a day.  She says that her last drink was in the bathroom here in the ED.  She adds that she used to live in a sober living facility but no longer lives there anymore.  Patient denies suicidal ideation.  Patient denies history of alcohol withdrawal seizures.  She denies other substance use.  Social: She used to live in sober living.    have reviewed the nursing notes.  Emergency Department course:  The patient was seen and examined at 1407 pm, in [facility] [address].  Breathalyzer is 0.412.  I treated the patient with thiamine, folic acid, and multivitamins p.o.     CBC is unremarkable.  Comprehensive metabolic panel is pending at the time of this dictation.     Kodi Dumont is a 32 year old female with a history of alcohol abuse who presents with " "acute alcohol intoxication and request for detox.  She is on the [facility] protocol for alcohol withdrawal.  She will be admitted [facility] to [facility] for detox.  She was signed out to Dr. Mistry at about 1600 pm for check of laboratory study results.   I have reviewed the findings, diagnosis, plan and need for follow up with the patient.    Per my interview with patient:    Onset of alcohol use at age 15. Became problematic at age 23. For the past 10 years, has been in and out of detox and sober living.   Alcohol quantity: 10 \"shooters\" per day  Alcohol use duration: two weeks following a period of 44 days of sobriety  Longest period of sobriety was: 6 months  Estimated number of detoxes: 10  History of CD treatment: 7 total treatments  BAL in ED:0.412  Urine drug screen: NEGATIVE  Anti-craving medications: Naltrexone, Antabuse, Campral. Notes that Vivitrol injection was most helpful for her.     Patient has tolerance, withdrawal, progressive use, loss of control, spending more time and more amount than intended. Patient has made attempts to quit, is experiencing cravings, and reports negative consequences.  Patient does not grant.    Patient does not have a history of seizures.  Patient does not have a history of delirium tremens.    Patient does not have a history of overdose.  Patient does not have a history of IV use.  Patient does not have a history of HIV, Hep B or C.              Psychiatric Review of Systems:   Depression:   Reports: depressed mood, \"which is pretty normal for the days after drinking\", decreased interest because of drinking, changes in sleep, changes in appetite, guilt, hopelessness, helplessness, decreased energy, irritability. Notes that depressive symptoms are not present in context of sobriety.    Denies: suicidal ideation, impaired concentration  [place]alle[place]:   Denies: sleeplessness, increased goal-directed activities, abrupt increase in energy, pressured speech  Psychosis:   Denies: " "visual hallucinations, auditory hallucinations, paranoia, grandiosity, ideas of reference, thought insertions, thought broadcasting.  Anxiety:   Reports: excessive worries that are difficult to control for the past 6 months, panic attacks x 2  PTSD:   Denies: re-experiencing past trauma, nightmares, increased arousal, avoidance of traumatic stimuli, impaired function.  OCD:   Denies: obsessions, checking, symmetry, cleaning, skin picking.  ED:   Denies: restriction, binging, purging.           Medical Review of Systems:     Review of systems positive for \"anxious and tired.\" Denies current withdrawal symptoms.   10 point review of systems is otherwise negative unless noted above.            Psychiatric History:   Mental health diagnoses: Depression and anxiety  Psychiatric Hospitalizations: None  History of Psychosis: None  Prior ECT: None  Court Commitment: None  Suicide Attempts: None  Self-injurious Behavior: None  Violence toward others: None  Use of Psychotropics: Zoloft, Wellbutrin, Doxepin         Substance Use History:     Alcohol: See HPI  Cannabis: none  Nicotine: none  Cocaine: none  Methamphetamine: none  Opiates/Heroin: none  Benzodiazepines: none  Hallucinogens: none  Inhalants: none           Social History:   Upbringing: Born and raised in [place] by both parents. Has two older siblings. Described childhood as \"privileged.\"   Educational History: Bachelors Degree in marketing  Relationships: In a relationship for 2.5 months. He is supportive and sober.   Children: None  Current Living Situation: Moved to [place] 4/2022 for CD treatment at [facility]. Was residing in sober living but relapsed.   Occupational History: Unemployed about three months. Previously working in .   Financial Support: Unemployment, \"just the minimum for now\"  Legal History: Patient denies  Abuse/Trauma History: Patient denies         Family History:   Patient denies  H/o attempted or completed suicides in family: None         Past Medical " History:   History reviewed. No pertinent past medical history.           Past Surgical History:   History reviewed. No pertinent surgical history.           Allergies:    No Known Allergies           Medications:   I have reviewed this patient's current medications  Medications Prior to Admission   Medication Sig Dispense Refill Last Dose     buPROPion (WELLBUTRIN XL) 150 MG 24 hr tablet Take 1 tablet by mouth daily   Past Week at 1.5 weeks ago     doxepin (SILENOR) 6 MG tablet TAKE 1/2 TO 1 TABLETS BY MOUTH DAILY AT BEDTIME   Past Week at 1.5 weeks ago     sertraline (ZOLOFT) 50 MG tablet Take 1 tablet by mouth daily   Past Week at 1.5 weeks ago             Labs:     Recent Results (from the past 24 hour(s))   Alcohol breath test POCT    Collection Time: 06/03/22  1:57 PM   Result Value Ref Range    Alcohol Breath Test 0.412 (A) 0.00 - 0.01   HCG qualitative urine    Collection Time: 06/03/22  3:23 PM   Result Value Ref Range    hCG Urine Qualitative Negative Negative   Drug abuse screen 1 urine (ED)    Collection Time: 06/03/22  3:23 PM   Result Value Ref Range    Amphetamines Urine Screen Negative Screen Negative    Barbiturates Urine Screen Negative Screen Negative    Benzodiazepines Urine Screen Negative Screen Negative    Cannabinoids Urine Screen Negative Screen Negative    Cocaine Urine Screen Negative Screen Negative    Opiates Urine Screen Negative Screen Negative   Comprehensive metabolic panel    Collection Time: 06/03/22  3:32 PM   Result Value Ref Range    Sodium 143 133 - 144 mmol/L    Potassium 2.9 (L) 3.4 - 5.3 mmol/L    Chloride 108 94 - 109 mmol/L    Carbon Dioxide (CO2) 26 20 - 32 mmol/L    Anion Gap 9 3 - 14 mmol/L    Urea Nitrogen 8 7 - 30 mg/dL    Creatinine 0.47 (L) 0.52 - 1.04 mg/dL    Calcium 7.9 (L) 8.5 - 10.1 mg/dL    Glucose 124 (H) 70 - 99 mg/dL    Alkaline Phosphatase 72 40 - 150 U/L    AST 19 0 - 45 U/L    ALT 23 0 - 50 U/L    Protein Total 6.9 6.8 - 8.8 g/dL    Albumin 3.5 3.4 - 5.0  g/dL    Bilirubin Total 0.1 (L) 0.2 - 1.3 mg/dL    GFR Estimate >90 >60 mL/min/1.73m2   HCG qualitative pregnancy (blood)    Collection Time: 06/03/22  3:32 PM   Result Value Ref Range    hCG Serum Qualitative Negative Negative   CBC with platelets and differential    Collection Time: 06/03/22  3:32 PM   Result Value Ref Range    WBC Count 8.8 4.0 - 11.0 10e3/uL    RBC Count 3.95 3.80 - 5.20 10e6/uL    Hemoglobin 12.4 11.7 - 15.7 g/dL    Hematocrit 35.7 35.0 - 47.0 %    MCV 90 78 - 100 fL    MCH 31.4 26.5 - 33.0 pg    MCHC 34.7 31.5 - 36.5 g/dL    RDW 11.9 10.0 - 15.0 %    Platelet Count 307 150 - 450 10e3/uL    % Neutrophils 72 %    % Lymphocytes 21 %    % Monocytes 5 %    % Eosinophils 0 %    % Basophils 1 %    % Immature Granulocytes 1 %    NRBCs per 100 WBC 0 <1 /100    Absolute Neutrophils 6.4 1.6 - 8.3 10e3/uL    Absolute Lymphocytes 1.8 0.8 - 5.3 10e3/uL    Absolute Monocytes 0.4 0.0 - 1.3 10e3/uL    Absolute Eosinophils 0.0 0.0 - 0.7 10e3/uL    Absolute Basophils 0.1 0.0 - 0.2 10e3/uL    Absolute Immature Granulocytes 0.0 <=0.4 10e3/uL    Absolute NRBCs 0.0 10e3/uL   Asymptomatic COVID-19 Virus (Coronavirus) by PCR Nose    Collection Time: 06/03/22  3:33 PM    Specimen: Nose; Swab   Result Value Ref Range    SARS CoV2 PCR Negative Negative   GGT    Collection Time: 06/04/22  5:53 AM   Result Value Ref Range    GGT 17 0 - 40 U/L   Lipid panel    Collection Time: 06/04/22  5:53 AM   Result Value Ref Range    Cholesterol 191 <200 mg/dL    Triglycerides 146 <150 mg/dL    Direct Measure HDL 62 >=50 mg/dL    LDL Cholesterol Calculated 100 <=100 mg/dL    Non HDL Cholesterol 129 <130 mg/dL   TSH with free T4 reflex and/or T3 as indicated    Collection Time: 06/04/22  5:53 AM   Result Value Ref Range    TSH 2.67 0.40 - 4.00 mU/L   Potassium    Collection Time: 06/04/22  5:53 AM   Result Value Ref Range    Potassium 3.2 (L) 3.4 - 5.3 mmol/L       /76   Pulse 73   Temp 97.6  F (36.4  C) (Temporal)   Resp 16    "Ht 1.524 m (5')   Wt 47.6 kg (105 lb)   LMP 05/15/2022   SpO2 98%   BMI 20.51 kg/m    Weight is 105 lbs 0 oz  Body mass index is 20.51 kg/m .         Psychiatric Mental Status Examination:   Appearance: awake, alert, slightly disheveled  Attitude: cooperative and pleasant  Eye Contact: good  Mood:  \"\"anxious and tired.\"  Affect: mood congruent and constricted  Speech:  clear, coherent and normal prosody  Language: fluent in English  Psychomotor Behavior:  no evidence of tardive dyskinesia, dystonia, or tics  Gait/Station: normal  Thought Process:  linear, logical, goal oriented  Associations:  no loose associations  Thought Content:  Denying SI/HI/AVH; no evidence of psychotic thinking  Insight:  good  Judgement: intact  Oriented to:  time, person, and place  Attention Span and Concentration:  intact  Recent and Remote Memory:  intact  Fund of Knowledge: appropriate    Clinical Global Impressions  First:7     Most recent:7              Physical Exam:   Please refer to physical exam completed by ED provider, Roxana Kidd MD, on 6/3/22. I agree with the findings and assessment and have no additional findings to add at this time.     "

## 2022-06-04 NOTE — PROGRESS NOTES
Met with patient to initiate discharge planning.  Patient reports they will be returning to [facility] once out of detox.  Patient declines need for case management as patient reports they will just need to coordinate a ride to return to treatment.

## 2022-06-05 VITALS
RESPIRATION RATE: 16 BRPM | WEIGHT: 105 LBS | HEART RATE: 86 BPM | DIASTOLIC BLOOD PRESSURE: 88 MMHG | OXYGEN SATURATION: 97 % | HEIGHT: 60 IN | SYSTOLIC BLOOD PRESSURE: 120 MMHG | TEMPERATURE: 97.2 F | BODY MASS INDEX: 20.62 KG/M2

## 2022-06-05 PROCEDURE — 250N000013 HC RX MED GY IP 250 OP 250 PS 637: Performed by: EMERGENCY MEDICINE

## 2022-06-05 PROCEDURE — 99239 HOSP IP/OBS DSCHRG MGMT >30: CPT | Performed by: PSYCHIATRY & NEUROLOGY

## 2022-06-05 PROCEDURE — 250N000013 HC RX MED GY IP 250 OP 250 PS 637: Performed by: PSYCHIATRY & NEUROLOGY

## 2022-06-05 RX ORDER — LANOLIN ALCOHOL/MO/W.PET/CERES
100 CREAM (GRAM) TOPICAL DAILY
Qty: 30 TABLET | Refills: 0 | Status: SHIPPED | OUTPATIENT
Start: 2022-06-05

## 2022-06-05 RX ORDER — BUPROPION HYDROCHLORIDE 150 MG/1
150 TABLET ORAL DAILY
Status: DISCONTINUED | OUTPATIENT
Start: 2022-06-05 | End: 2022-06-05 | Stop reason: HOSPADM

## 2022-06-05 RX ORDER — FOLIC ACID 1 MG/1
1 TABLET ORAL DAILY
Qty: 30 TABLET | Refills: 0 | Status: SHIPPED | OUTPATIENT
Start: 2022-06-05

## 2022-06-05 RX ORDER — DOXEPIN 6 MG/1
6 TABLET, FILM COATED ORAL AT BEDTIME
Qty: 30 TABLET | Refills: 0 | Status: SHIPPED | OUTPATIENT
Start: 2022-06-05

## 2022-06-05 RX ORDER — NALTREXONE HYDROCHLORIDE 50 MG/1
50 TABLET, FILM COATED ORAL DAILY
Qty: 30 TABLET | Refills: 0 | Status: SHIPPED | OUTPATIENT
Start: 2022-06-05

## 2022-06-05 RX ORDER — NALTREXONE HYDROCHLORIDE 50 MG/1
50 TABLET, FILM COATED ORAL DAILY
Status: DISCONTINUED | OUTPATIENT
Start: 2022-06-05 | End: 2022-06-05 | Stop reason: HOSPADM

## 2022-06-05 RX ADMIN — BUPROPION HYDROCHLORIDE 150 MG: 150 TABLET, EXTENDED RELEASE ORAL at 10:02

## 2022-06-05 RX ADMIN — FOLIC ACID 1 MG: 1 TABLET ORAL at 10:02

## 2022-06-05 RX ADMIN — SERTRALINE HYDROCHLORIDE 50 MG: 50 TABLET ORAL at 10:02

## 2022-06-05 RX ADMIN — MULTIPLE VITAMINS W/ MINERALS TAB 1 TABLET: TAB at 10:02

## 2022-06-05 RX ADMIN — NALTREXONE HYDROCHLORIDE 50 MG: 50 TABLET, FILM COATED ORAL at 10:02

## 2022-06-05 RX ADMIN — THIAMINE HCL TAB 100 MG 100 MG: 100 TAB at 10:02

## 2022-06-05 ASSESSMENT — ACTIVITIES OF DAILY LIVING (ADL)
ORAL_HYGIENE: INDEPENDENT
LAUNDRY: WITH SUPERVISION
DRESS: INDEPENDENT
HYGIENE/GROOMING: INDEPENDENT

## 2022-06-05 NOTE — DISCHARGE SUMMARY
"Psychiatric Discharge Summary    Kodi Dumont MRN# [id number]   Age: 32 year old YOB: 1990     Date of Admission:  6/3/2022  Date of Discharge:  6/5/2022  Admitting Physician:  María Corral MD  Discharge Physician:  María Corral MD (Contact: [phone])         Event Leading to Hospitalization:   Per H&P:    Per ED Provider Note dated 6/3/22:     The history is provided by the patient and medical records.      Kodi Dumont is a 32 year old female with a past medical history significant for alcohol abuse who presents to the Emergency Department for evaluation of alcohol intoxication and request for detox..  The patient states that she is in alcohol withdrawal.     Patient reports that she went to [facility] facility today for detox from alcohol but was told to come to the ED today to detox.  She states that she has been drinking about 7 \"nips\" a day.  She says that her last drink was in the bathroom here in the ED.  She adds that she used to live in a sober living facility but no longer lives there anymore.  Patient denies suicidal ideation.  Patient denies history of alcohol withdrawal seizures.  She denies other substance use.  Social: She used to live in sober living.     have reviewed the nursing notes.  Emergency Department course:  The patient was seen and examined at 1407 pm, in [facility].  Breathalyzer is 0.412.  I treated the patient with thiamine, folic acid, and multivitamins p.o.     CBC is unremarkable.  Comprehensive metabolic panel is pending at the time of this dictation.     Kodi Dumont is a 32 year old female with a history of alcohol abuse who presents with acute alcohol intoxication and request for detox.  She is on the [facility] protocol for alcohol withdrawal.  She will be admitted here to [facility] for detox.  She was signed out to Dr. Mistry at about 1600 pm for check of laboratory study results.   I have reviewed the findings, diagnosis, " "plan and need for follow up with the patient.     Per my interview with patient:     Onset of alcohol use at age 15. Became problematic at age 23. For the past 10 years, has been in and out of detox and sober living.   Alcohol quantity: 10 \"shooters\" per day  Alcohol use duration: two weeks following a period of 44 days of sobriety  Longest period of sobriety was: 6 months  Estimated number of detoxes: 10  History of CD treatment: 7 total treatments  BAL in ED:0.412  Urine drug screen: NEGATIVE  Anti-craving medications: Naltrexone, Antabuse, Campral. Notes that Vivitrol injection was most helpful for her.      Patient has tolerance, withdrawal, progressive use, loss of control, spending more time and more amount than intended. Patient has made attempts to quit, is experiencing cravings, and reports negative consequences.  Patient does not grant.     Patient does not have a history of seizures.  Patient does not have a history of delirium tremens.     Patient does not have a history of overdose.  Patient does not have a history of IV use.  Patient does not have a history of HIV, Hep B or C.       See Admission note by María Corral MD on 6/4/22 for additional details.          Diagnoses:     Alcohol Use Disorder, severe, withdrawal resolved, complicated by hypokalemia  Substance induced mood disorder  Generalized Anxiety Disorder         Labs:     Recent Results (from the past 168 hour(s))   Alcohol breath test POCT    Collection Time: 06/03/22  1:57 PM   Result Value Ref Range    Alcohol Breath Test 0.412 (A) 0.00 - 0.01   HCG qualitative urine    Collection Time: 06/03/22  3:23 PM   Result Value Ref Range    hCG Urine Qualitative Negative Negative   Drug abuse screen 1 urine (ED)    Collection Time: 06/03/22  3:23 PM   Result Value Ref Range    Amphetamines Urine Screen Negative Screen Negative    Barbiturates Urine Screen Negative Screen Negative    Benzodiazepines Urine Screen Negative Screen Negative    " Cannabinoids Urine Screen Negative Screen Negative    Cocaine Urine Screen Negative Screen Negative    Opiates Urine Screen Negative Screen Negative   Comprehensive metabolic panel    Collection Time: 06/03/22  3:32 PM   Result Value Ref Range    Sodium 143 133 - 144 mmol/L    Potassium 2.9 (L) 3.4 - 5.3 mmol/L    Chloride 108 94 - 109 mmol/L    Carbon Dioxide (CO2) 26 20 - 32 mmol/L    Anion Gap 9 3 - 14 mmol/L    Urea Nitrogen 8 7 - 30 mg/dL    Creatinine 0.47 (L) 0.52 - 1.04 mg/dL    Calcium 7.9 (L) 8.5 - 10.1 mg/dL    Glucose 124 (H) 70 - 99 mg/dL    Alkaline Phosphatase 72 40 - 150 U/L    AST 19 0 - 45 U/L    ALT 23 0 - 50 U/L    Protein Total 6.9 6.8 - 8.8 g/dL    Albumin 3.5 3.4 - 5.0 g/dL    Bilirubin Total 0.1 (L) 0.2 - 1.3 mg/dL    GFR Estimate >90 >60 mL/min/1.73m2   HCG qualitative pregnancy (blood)    Collection Time: 06/03/22  3:32 PM   Result Value Ref Range    hCG Serum Qualitative Negative Negative   CBC with platelets and differential    Collection Time: 06/03/22  3:32 PM   Result Value Ref Range    WBC Count 8.8 4.0 - 11.0 10e3/uL    RBC Count 3.95 3.80 - 5.20 10e6/uL    Hemoglobin 12.4 11.7 - 15.7 g/dL    Hematocrit 35.7 35.0 - 47.0 %    MCV 90 78 - 100 fL    MCH 31.4 26.5 - 33.0 pg    MCHC 34.7 31.5 - 36.5 g/dL    RDW 11.9 10.0 - 15.0 %    Platelet Count 307 150 - 450 10e3/uL    % Neutrophils 72 %    % Lymphocytes 21 %    % Monocytes 5 %    % Eosinophils 0 %    % Basophils 1 %    % Immature Granulocytes 1 %    NRBCs per 100 WBC 0 <1 /100    Absolute Neutrophils 6.4 1.6 - 8.3 10e3/uL    Absolute Lymphocytes 1.8 0.8 - 5.3 10e3/uL    Absolute Monocytes 0.4 0.0 - 1.3 10e3/uL    Absolute Eosinophils 0.0 0.0 - 0.7 10e3/uL    Absolute Basophils 0.1 0.0 - 0.2 10e3/uL    Absolute Immature Granulocytes 0.0 <=0.4 10e3/uL    Absolute NRBCs 0.0 10e3/uL   Asymptomatic COVID-19 Virus (Coronavirus) by PCR Nose    Collection Time: 06/03/22  3:33 PM    Specimen: Nose; Swab   Result Value Ref Range    SARS CoV2  PCR Negative Negative   GGT    Collection Time: 06/04/22  5:53 AM   Result Value Ref Range    GGT 17 0 - 40 U/L   Lipid panel    Collection Time: 06/04/22  5:53 AM   Result Value Ref Range    Cholesterol 191 <200 mg/dL    Triglycerides 146 <150 mg/dL    Direct Measure HDL 62 >=50 mg/dL    LDL Cholesterol Calculated 100 <=100 mg/dL    Non HDL Cholesterol 129 <130 mg/dL   TSH with free T4 reflex and/or T3 as indicated    Collection Time: 06/04/22  5:53 AM   Result Value Ref Range    TSH 2.67 0.40 - 4.00 mU/L   Potassium    Collection Time: 06/04/22  5:53 AM   Result Value Ref Range    Potassium 3.2 (L) 3.4 - 5.3 mmol/L          Consults:   Consultation during this admission received from internal medicine on          Hospital Course:   Patient was admitted to [facility] with attending María Corral MD as a voluntary patient. The patient was placed under status 15 (15 minute checks) to ensure patient safety.     MSSA protocol was initiated due to the patient's history of alcohol abuse and concern for withdrawal symptoms.  Wellbutrin was held temporarily in context of alcohol withdrawal, na was restarted at time of discharge. Over the course of hospitalization, patient was treated with Ativan and Valium to manage withdrawal. Last dose of Valium was given on 6/3/22. She completed detox on 6/4/22. She required a TOTAL of 2mg of Ativan and 20 mg of Valium since arrival in ED. She has not experienced any significant symptoms of withdrawal since that time. She experienced no complications associated with withdrawal. Hydroxyzine and Trazodone were utilized prn for management of anxiety and sleep, respectively. Patient was treated with multivitamin, thiamine, and folic acid daily. She was evaluated by IM (see above). She was medically cleared at time of discharge. Anti-craving medications were discussed, and naltrexone was re-initiated after R/B/A were reviewed with patient. She tolerated medications well without  reported side effects. She is planning to follow up with [facility] provider to initial Vivitrol. Declined Antabuse.     Patient denied alcohol cravings at time of discharge. She understands risks associated with relapse. Appears to be motivated to maintain sobriety upon discharge.      She did participate in groups and was visible in the milieu.     The patient's symptoms of withdrawal fully resolved.     Patient was released to [facility]. At the time of discharge, patient was determined to not be a danger to himself or others. She consistently denied SI/HI, and remained future oriented. Denied access to firearms.     Because this patient meets criteria for an Alcohol Use Disorder, I performed the following brief intervention on the date of this note:              1) Expressed concern that the patient is drinking at unhealthy levels known to increase their risk of alcohol related problems              2) Gave feedback linking alcohol use and health, including personalized feedback explaining how alcohol use can interact with their medical and/or psychiatric problems, and with prescribed medications.              3) Advised patient to abstain.           Discharge Medications:     Discharge Medication List as of 6/5/2022 10:13 AM      START taking these medications    Details   folic acid (FOLVITE) 1 MG tablet Take 1 tablet (1 mg) by mouth daily, Disp-30 tablet, R-0, E-Prescribe      naltrexone (DEPADE/REVIA) 50 MG tablet Take 1 tablet (50 mg) by mouth daily, Disp-30 tablet, R-0, E-Prescribe      thiamine (B-1) 100 MG tablet Take 1 tablet (100 mg) by mouth daily, Disp-30 tablet, R-0, E-Prescribe         CONTINUE these medications which have CHANGED    Details   doxepin (SILENOR) 6 MG tablet Take 1 tablet (6 mg) by mouth At Bedtime, Disp-30 tablet, R-0, E-Prescribe         CONTINUE these medications which have NOT CHANGED    Details   buPROPion (WELLBUTRIN XL) 150 MG 24 hr tablet Take 1 tablet by mouth  daily, Historical      sertraline (ZOLOFT) 50 MG tablet Take 1 tablet by mouth daily, Historical                  Psychiatric Examination:   Appearance:  awake, alert and adequately groomed  Attitude:  cooperative  Eye Contact:  good  Mood:  better  Affect:  appropriate and in normal range  Speech:  clear, coherent  Psychomotor Behavior:  no evidence of tardive dyskinesia, dystonia, or tics  Thought Process:  logical, linear and goal oriented  Associations:  no loose associations  Thought Content:  no evidence of suicidal ideation or homicidal ideation and no evidence of psychotic thought  Insight:  good  Judgment:  intact  Oriented to:  time, person, and place  Attention Span and Concentration:  intact  Recent and Remote Memory:  intact  Language: Able to name objects, Able to repeat phrases and Able to read and write  Fund of Knowledge: appropriate  Muscle Strength and Tone: normal  Gait and Station: Normal         Discharge Plan:   Summary: You were admitted to [facility] on 6/3/2022 for detoxification from alcohol.  A medical exam was performed that included lab work. You have met with a  and opted to return to [facility].  Please take care and make your recovery a priority, Kodi!     Recommendation:  Complete treatment and follow all recommendations of [facility].     Main Diagnosis: Per Dr. Ellis MD;     Alcohol Use Disorder, severe, in withdrawal, complicated by hypokalemia  Substance induced mood disorder  Generalized Anxiety Disorder     Major Treatments, Procedures and Findings:  You were detoxed from alcohol with the Modified Selective Severity Protocol using Valium. You have met with a  to develop a treatment plan for discharge.  You have had labs drawn and a copy of those labs will be sent home with you.  Please bring your lab results with to your follow up doctor appointment.     Symptoms to Report:  If you experience more anxiety, confusion, sleeplessness, deep sadness or  thoughts of suicide, notify your treatment team or notify your primary care physician. IF ANY OF THE SYMPTOMS YOU ARE EXPERIENCING ARE A MEDICAL EMERGENCY CALL 911 IMMEDIATELY.      Lifestyle Adjustment: Adjust your lifestyle to get enough sleep, relaxation, exercise and  good nutrition. Continue to develop healthy coping skills to decrease stress and promote a sober living environment. Do not use alcohol, illegal drugs or addictive medications other than what is currently prescribed. AA, NA, and  Sponsor are excellent resources for support.      Primary Provider:  Please follow up with your primary care provider within 30 days of discharge.         DISCHARGE RESOURCES:  -SMART Recovery - self management for addiction recovery:  www.smartrecovery.org    -Pathways ~ A Health Crisis Resource & Support Center: 171.989.3043.  -[facility] [phone]   -[facility] Behavioral Intake 303-292-5465 or 417-132-6697.  -Suicide Awareness Voices of Education (SAVE) (www.save.org): 080-120-EASM (6982)  -National Suicide Prevention Line (www.mentalhealthmn.org): 216-115-WBZA (8223)  -National [place] on Mental Illness (www.mn.ramon.org): 473.248.8881 or 963-573-1063.  -Hddq3wcve: text the word LIFE to 31701 for immediate support and crisis intervention  -Mental Health Consumer/Survivor Network of [place] (www.mhcsn.net): 362.984.6342 or 676-169-1861  -Mental Health Association of [place] (www.mentalhealth.org): 853.743.2938 or 997-954-2667     -Substance Abuse and Mental Health Services (www.samhsa.gov)  -Harm Reduction Coalition (www. Harmreduction.org)  -www.prescribetoprevent.org or http://prescribetoprevent.org/video  -Poison control 4-506-191-3098   **[place] Opioid Prevention Coalition: www.opioidcoalition.org     Sober Support Group Information:  AA/NA & Sponsor/Support  -Alcoholics Anonymous (www.alcoholics-anonymous.org): for local information 24 hours/day  -AA Intergroup service office in  [place] (http://www.aastpaul.org/) 207.838.9219  -AA Intergroup service office in [facility]: 149.187.6547. (http://www.aamin[place].org/)  -Narcotics Anonymous (www.nam[place].org) (359) 678-2981   **Sober Fun Activities: www.sober[employer] College Prepactivities.[employer]/[facility]//[facility] Recovery Connection ([facility])  [facility] connects people seeking recovery to resources that help foster and sustain long-term recovery.  Whether you are seeking resources for treatment, transportation, housing, job training, education, health care or other pathways to recovery, [facility] is a great place to start.    Phone: 935.876.1590.  www.[place]AlphaSights.[employer] (Great listing of all types of recovery and non-recovery related resources)       General Medication Instructions:   See your medication sheet(s) for instructions.   Take all medicines as directed.  Make no changes unless your doctor suggests them.   Go to all your doctor visits.  Be sure to have all your required lab tests. This way, your medicines can be refilled on time.  Do not use any drugs not prescribed by your provider.  AA/NA and Sponsors are excellent resources for support  Avoid alcohol.     Any follow up concerns:  Nursing questions call the Unit [facility] [phone]  Medical Record call [phone]  Outpatient Behavioral Intake call [phone]  LP+ Wait List/Bed Availability call [phone]     The entire treatment team has appreciated the opportunity to work with you Kodi.  We wish you the best in the future and with your lifelong recovery goals. Please bring this discharge folder with you to all follow up appointments.  It contains your lab results, diagnosis, medication list and discharge recommendations.     THANK YOU FOR CHOOSING THE [facility]     Attestation:  The patient has been seen and evaluated by me,  María Corral MD     > 35 minutes total time that was spent and over 50% of this time was spent in counseling  and coordination of care with staff, reviewing medical record, educating patient about treatment options, side effects and benefits and alternative treatments for medications, providing supportive therapy and redirection regarding above symptoms.     This document is created with the help of Dragon dictation system.  All grammatical/typing errors or context distortion are unintentional and inherent to software.

## 2022-06-05 NOTE — PLAN OF CARE
Goal Outcome Evaluation:    Plan of Care Reviewed With: patient     Overall Patient Progress: improving         Pt to be discharged, states that she is going to take an Uber to [facility]. AVS and labs reviewed with patient, copies sent with patient and all questions answered. Pt discharged with all medications and belongings.

## 2022-06-05 NOTE — PLAN OF CARE
Problem: Behavioral Health Plan of Care  Goal: Optimal Comfort and Wellbeing  Outcome: Ongoing, Progressing       Behavioral  Pt appeared sleeping comfortably overnight; breathing was even and unlabored.      Medical  Pt out of detox from alcohol.   No new medical concerns noted.

## 2022-06-05 NOTE — PLAN OF CARE
Problem: Alcohol Withdrawal  Goal: Alcohol Withdrawal Symptom Control  Outcome: Met     Problem: Acute Neurologic Deterioration (Alcohol Withdrawal)  Goal: Optimal Neurologic Function  Outcome: Met     Problem: Substance Misuse (Alcohol Withdrawal)  Goal: Readiness for Change Identified  Outcome: Met      Patient has not required any valium for alcohol detox  > 24 hours. All MSSA scores since that time have been less than 8. Pt is now removed from alcohol detox monitoring status.

## 2022-06-05 NOTE — PLAN OF CARE
Goal Outcome Evaluation:    Plan of Care Reviewed With: patient     Overall Patient Progress: improving         Pt continues to be monitored for alcohol withdrawal. MSSA scores of 4 and 3 this shift. Pt has a good appetite, participated in art therapy group. With the exception of group and meals patient is isolative to room. Pt endorses mild anxiety and moderate depression, denies SI. Pt has a plan to discharge to [facility] for residential CD treatment on completion of detox process. Encouraged pt to contact [facility] first thing in the morning to inquire if they need any information from the unit or patient to have another COVID test.

## 2023-02-24 ENCOUNTER — APPOINTMENT (OUTPATIENT)
Dept: CT IMAGING | Age: 33
End: 2023-02-24

## 2023-02-24 ENCOUNTER — APPOINTMENT (OUTPATIENT)
Dept: GENERAL RADIOLOGY | Age: 33
End: 2023-02-24

## 2023-02-24 ENCOUNTER — HOSPITAL ENCOUNTER (EMERGENCY)
Age: 33
Discharge: HOME | End: 2023-02-24
Attending: EMERGENCY MEDICINE

## 2023-02-24 VITALS
TEMPERATURE: 97.8 F | DIASTOLIC BLOOD PRESSURE: 84 MMHG | SYSTOLIC BLOOD PRESSURE: 128 MMHG | HEIGHT: 60 IN | WEIGHT: 110 LBS | BODY MASS INDEX: 21.6 KG/M2 | OXYGEN SATURATION: 96 % | RESPIRATION RATE: 20 BRPM | HEART RATE: 91 BPM

## 2023-02-24 DIAGNOSIS — F10.920 ACUTE ALCOHOLIC INTOXICATION WITHOUT COMPLICATION (HCC): Primary | ICD-10-CM

## 2023-02-24 LAB
ETHANOL PERCENT: 0.33 %
ETHANOL: 326 MG/DL
HCG(URINE) PREGNANCY TEST: NEGATIVE

## 2023-02-24 PROCEDURE — 81025 URINE PREGNANCY TEST: CPT

## 2023-02-24 PROCEDURE — G0480 DRUG TEST DEF 1-7 CLASSES: HCPCS

## 2023-02-24 PROCEDURE — 70450 CT HEAD/BRAIN W/O DYE: CPT

## 2023-02-24 PROCEDURE — 72125 CT NECK SPINE W/O DYE: CPT

## 2023-02-24 PROCEDURE — 99285 EMERGENCY DEPT VISIT HI MDM: CPT

## 2023-02-24 ASSESSMENT — PAIN - FUNCTIONAL ASSESSMENT: PAIN_FUNCTIONAL_ASSESSMENT: NONE - DENIES PAIN

## 2023-02-24 ASSESSMENT — ENCOUNTER SYMPTOMS
CHEST TIGHTNESS: 0
ABDOMINAL DISTENTION: 0
EYE PAIN: 0
ABDOMINAL PAIN: 0
BACK PAIN: 0
SHORTNESS OF BREATH: 0
FACIAL SWELLING: 0
EYE DISCHARGE: 0

## 2023-02-24 NOTE — ED NOTES
met with patient at bedside. Patient refusing to answer questions and continuing to request someone call her boyfriend Vincenzo. Patient dialed and person answered. Patients boyfriend states that he will come to [facility] after work. He states that patient has a drinking problem but did not elaborate. Patient declining need for alcohol treatment at this time.

## 2023-02-24 NOTE — ED PROVIDER NOTES
EMERGENCY DEPARTMENT ENCOUNTER    Pt Name: Светлана Parrish  MRN: [id number]  [place] 1990  Date of evaluation: 2/24/23  CHIEF COMPLAINT       Chief Complaint   Patient presents with    Alcohol Intoxication     In [facility]     HISTORY OF PRESENT ILLNESS   HPI  Patient is a 69-year-old female who presented to the emergency department by EMS secondary to suspected alcohol intoxication. Patient was found in the bathroom [facility] consuming what appeared to be wine. EMS and [employer] was subsequently called. Patient was transported to the emergency department for further evaluation and treatment. Patient admits to drinking today. She denied any trauma or injury. She is unsure when her last menstrual period goes. Patient denies chest pain, shortness of breath, nausea, vomiting, fevers or chills      REVIEW OF SYSTEMS     Review of Systems   Constitutional:  Negative for chills, diaphoresis and fever. HENT:  Negative for congestion, ear pain and facial swelling. Eyes:  Negative for pain, discharge and visual disturbance. Respiratory:  Negative for chest tightness and shortness of breath. Cardiovascular:  Negative for chest pain and palpitations. Gastrointestinal:  Negative for abdominal distention and abdominal pain. Genitourinary:  Negative for difficulty urinating and flank pain. Musculoskeletal:  Negative for back pain. Skin:  Negative for wound. Neurological:  Negative for dizziness, light-headedness and headaches. PASTMEDICAL HISTORY   History reviewed. No pertinent past medical history. Past Problem List  There is no problem list on file for this patient. SURGICAL HISTORY     History reviewed. No pertinent surgical history. CURRENT MEDICATIONS       Previous Medications    No medications on file     ALLERGIES     has No Known Allergies. FAMILY HISTORY     has no family status information on file.       SOCIAL HISTORY       Social History     Tobacco Use    Smoking status: Never Substance Use Topics    Alcohol use: Yes     PHYSICAL EXAM     INITIAL VITALS: /84   Pulse 91   Temp 97.8 °F (36.6 °C) (Oral)   Resp 20   Ht 5' (1.524 m)   Wt 110 lb (49.9 kg)   LMP 02/10/2023   SpO2 96%   BMI 21.48 kg/m²    Physical Exam  Vitals and nursing note reviewed. Constitutional:       General: She is not in acute distress. Appearance: She is well-developed. She is not diaphoretic. HENT:      Head: Normocephalic and atraumatic. Eyes:      Pupils: Pupils are equal, round, and reactive to light. Cardiovascular:      Rate and Rhythm: Normal rate and regular rhythm. Pulmonary:      Effort: Pulmonary effort is normal.      Breath sounds: Normal breath sounds. Abdominal:      General: Bowel sounds are normal.      Palpations: Abdomen is soft. Musculoskeletal:         General: Normal range of motion. Cervical back: Normal range of motion and neck supple. Skin:     General: Skin is warm. Capillary Refill: Capillary refill takes less than 2 seconds. Neurological:      Mental Status: She is alert and oriented to person, place, and time. Psychiatric:      Comments: Patient tearful, she denies suicidal or homicidal ideations. She denies auditory visual hallucinations       MEDICAL DECISION MAKING / ED COURSE:   Summary of Patient Presentation:    Patient is a 68-year-old female who presented to the emergency department via EMS secondary to suspected alcohol intoxication. The patient admits to drinking alcohol. Orders for CT head neck as well as chest x-ray, pregnancy test and alcohol level.     1)  Number and Complexity of Problems  Problem List This Visit: Alcohol intoxication    Differential Diagnosis: Alcohol intoxication, head injury    Diagnoses Considered but Do Not Suspect:  none    Pertinent Comorbid Conditions:  none    2)  Data Reviewed  My EKG interpretation:  NA     Decision Rules/Scores utilized:  NA    HEART SCORE: NA*       NIH STROKE SCALE Tests considered but not ordered and why:  none    External Documents Reviewed:  none    Imaging that is independently reviewed and interpreted by me as:  none    See more data below for the lab and radiology tests and orders. 3)  Treatment and Disposition    Patient repeat assessment: Alcohol level 326, CT head and neck no acute findings. Per EMS patient was noted to be released without call to [employer]. Social work as well as Public Safety contacted per protocol for discharged [employer] upon release. Released to the [employer] upon discharge. Disposition discussion with patient/family:  NA    Case discussed with consulting clinician:  DEBBI    MIPS:  NA    Social determinants of health impacting treatment or disposition:  none    Shared Decision Making: The patient was involved in his/her plan of care through shared decision making. The testing that was ordered was discussed with the patient. Any medications that may have been ordered were discussed with the patient    Code Status Discussion:  full code    \"ED Course\" Notes From Epic Narrator:         CRITICAL CARE:       PROCEDURES:    Procedures      DATA FOR LAB AND RADIOLOGY TESTS ORDERED BELOW ARE REVIEWED BY THE ED CLINICIAN:    RADIOLOGY: All x-rays, CT, MRI, and formal ultrasound images (except ED bedside ultrasound) are read by the radiologist, see reports below, unless otherwise noted in MDM or here. Reports below are reviewed by myself. CT CSpine W/O Contrast   Final Result   No acute intracranial abnormalities are noted. No acute abnormality of the cervical spine. CT Head W/O Contrast   Final Result   No acute intracranial abnormalities are noted. No acute abnormality of the cervical spine. LABS: Lab orders shown below, the results are reviewed by myself, and all abnormals are listed below.   Labs Reviewed   ETHANOL - Abnormal; Notable for the following components:       Result Value    Ethanol 326 (*)     Ethanol percent 0.326 (*)     All other components within normal limits   PREGNANCY, URINE       Vitals Reviewed:    Vitals:    02/24/23 1436   BP: 128/84   Pulse: 91   Resp: 20   Temp: 97.8 °F (36.6 °C)   TempSrc: Oral   SpO2: 96%   Weight: 110 lb (49.9 kg)   Height: 5' (1.524 m)     MEDICATIONS GIVEN TO PATIENT THIS ENCOUNTER:  No orders of the defined types were placed in this encounter. DISCHARGE PRESCRIPTIONS:  New Prescriptions    No medications on file     PHYSICIAN CONSULTS ORDERED THIS ENCOUNTER:  None  FINAL IMPRESSION      1. Acute alcoholic intoxication without complication [facility])          DISPOSITION/PLAN   DISPOSITION Decision To Discharge 02/24/2023 05:22:16 PM      OUTPATIENT FOLLOW UP THE PATIENT:    Please call [phone] to establish care with a primary care physician.         MD Shantal Gilbert MD  39/20/65 [id number]

## 2023-02-24 NOTE — ED NOTES
Dr. Leo Fairbanks informed LSW to call [employer] as patient is being discharged and is to be taken into custody.

## 2023-06-07 ENCOUNTER — HOSPITAL ENCOUNTER (EMERGENCY)
Age: 33
Discharge: HOME | End: 2023-06-08
Attending: EMERGENCY MEDICINE
Payer: COMMERCIAL

## 2023-06-07 DIAGNOSIS — F10.10 ALCOHOL ABUSE: ICD-10-CM

## 2023-06-07 DIAGNOSIS — O03.9 MISCARRIAGE: Primary | ICD-10-CM

## 2023-06-07 ASSESSMENT — PAIN - FUNCTIONAL ASSESSMENT: PAIN_FUNCTIONAL_ASSESSMENT: NONE - DENIES PAIN

## 2023-06-08 VITALS
DIASTOLIC BLOOD PRESSURE: 83 MMHG | RESPIRATION RATE: 20 BRPM | OXYGEN SATURATION: 97 % | BODY MASS INDEX: 21.6 KG/M2 | HEART RATE: 87 BPM | SYSTOLIC BLOOD PRESSURE: 108 MMHG | WEIGHT: 110 LBS | TEMPERATURE: 98.1 F | HEIGHT: 60 IN

## 2023-06-08 LAB
ALBUMIN SERPL-MCNC: 4.5 G/DL (ref 3.5–5.2)
ALP SERPL-CCNC: 65 U/L (ref 35–104)
ALT SERPL-CCNC: 20 U/L (ref 5–33)
ANION GAP SERPL CALCULATED.3IONS-SCNC: 15 MMOL/L (ref 9–17)
AST SERPL-CCNC: 28 U/L
BACTERIA URNS QL MICRO: ABNORMAL
BASOPHILS # BLD: 0.04 K/UL (ref 0–0.2)
BASOPHILS NFR BLD: 1 % (ref 0–2)
BILIRUB SERPL-MCNC: 0.3 MG/DL (ref 0.3–1.2)
BILIRUB UR QL STRIP: NEGATIVE
BUN SERPL-MCNC: 11 MG/DL (ref 6–20)
BUN/CREAT SERPL: 19 (ref 9–20)
CALCIUM SERPL-MCNC: 8 MG/DL (ref 8.6–10.4)
CHLORIDE SERPL-SCNC: 104 MMOL/L (ref 98–107)
CLARITY UR: ABNORMAL
CO2 SERPL-SCNC: 25 MMOL/L (ref 20–31)
COLOR UR: YELLOW
CREAT SERPL-MCNC: 0.58 MG/DL (ref 0.5–0.9)
EOSINOPHIL # BLD: <0.03 K/UL (ref 0–0.44)
EOSINOPHILS RELATIVE PERCENT: 0 % (ref 1–4)
EPI CELLS #/AREA URNS HPF: ABNORMAL /HPF (ref 0–5)
ERYTHROCYTE [DISTWIDTH] IN BLOOD BY AUTOMATED COUNT: 12.8 % (ref 11.8–14.4)
ETHANOL PERCENT: 0.28 %
ETHANOLAMINE SERPL-MCNC: 285 MG/DL
GFR SERPL CREATININE-BSD FRML MDRD: >60 ML/MIN/1.73M2
GLUCOSE SERPL-MCNC: 138 MG/DL (ref 70–99)
GLUCOSE UR STRIP-MCNC: NEGATIVE MG/DL
HCG QUANTITATIVE: 15 MIU/ML
HCT VFR BLD AUTO: 40.6 % (ref 36.3–47.1)
HGB BLD-MCNC: 13.6 G/DL (ref 11.9–15.1)
HGB UR QL STRIP.AUTO: ABNORMAL
IMM GRANULOCYTES # BLD AUTO: 0.02 K/UL (ref 0–0.3)
IMM GRANULOCYTES NFR BLD: 0 %
KETONES UR STRIP-MCNC: NEGATIVE MG/DL
LEUKOCYTE ESTERASE UR QL STRIP: NEGATIVE
LIPASE SERPL-CCNC: 26 U/L (ref 13–60)
LYMPHOCYTES # BLD: 26 % (ref 24–43)
LYMPHOCYTES NFR BLD: 1.3 K/UL (ref 1.1–3.7)
MAGNESIUM SERPL-MCNC: 2.1 MG/DL (ref 1.6–2.6)
MCH RBC QN AUTO: 30.9 PG (ref 25.2–33.5)
MCHC RBC AUTO-ENTMCNC: 33.5 G/DL (ref 28.4–34.8)
MCV RBC AUTO: 92.3 FL (ref 82.6–102.9)
MONOCYTES NFR BLD: 0.27 K/UL (ref 0.1–1.2)
MONOCYTES NFR BLD: 5 % (ref 3–12)
MUCOUS THREADS URNS QL MICRO: ABNORMAL
NEUTROPHILS NFR BLD: 68 % (ref 36–65)
NEUTS SEG NFR BLD: 3.41 K/UL (ref 1.5–8.1)
NITRITE UR QL STRIP: NEGATIVE
PH UR STRIP: 6.5 [PH] (ref 5–8)
PLATELET # BLD AUTO: 475 K/UL (ref 138–453)
PMV BLD AUTO: 9.7 FL (ref 8.1–13.5)
POTASSIUM SERPL-SCNC: 3.5 MMOL/L (ref 3.7–5.3)
PROT SERPL-MCNC: 6.8 G/DL (ref 6.4–8.3)
PROT UR STRIP-MCNC: ABNORMAL MG/DL
RBC # BLD AUTO: 4.4 M/UL (ref 3.95–5.11)
RBC #/AREA URNS HPF: ABNORMAL /HPF (ref 0–2)
SODIUM SERPL-SCNC: 144 MMOL/L (ref 135–144)
SP GR UR STRIP: 1.02 (ref 1–1.03)
UROBILINOGEN UR STRIP-ACNC: NORMAL
WBC #/AREA URNS HPF: ABNORMAL /HPF (ref 0–5)
WBC OTHER # BLD: 5 K/UL (ref 3.5–11.3)

## 2023-06-08 PROCEDURE — 6360000002 HC RX W HCPCS: Performed by: EMERGENCY MEDICINE

## 2023-06-08 PROCEDURE — 2580000003 HC RX 258: Performed by: EMERGENCY MEDICINE

## 2023-06-08 PROCEDURE — 83690 ASSAY OF LIPASE: CPT

## 2023-06-08 PROCEDURE — 84702 CHORIONIC GONADOTROPIN TEST: CPT

## 2023-06-08 PROCEDURE — 80053 COMPREHEN METABOLIC PANEL: CPT

## 2023-06-08 PROCEDURE — 85027 COMPLETE CBC AUTOMATED: CPT

## 2023-06-08 PROCEDURE — 81001 URINALYSIS AUTO W/SCOPE: CPT

## 2023-06-08 PROCEDURE — G0480 DRUG TEST DEF 1-7 CLASSES: HCPCS

## 2023-06-08 PROCEDURE — 83735 ASSAY OF MAGNESIUM: CPT

## 2023-06-08 RX ORDER — ONDANSETRON 2 MG/ML
4 INJECTION INTRAMUSCULAR; INTRAVENOUS ONCE
Status: COMPLETED | OUTPATIENT
Start: 2023-06-08 | End: 2023-06-08

## 2023-06-08 RX ORDER — 0.9 % SODIUM CHLORIDE 0.9 %
1000 INTRAVENOUS SOLUTION INTRAVENOUS ONCE
Status: COMPLETED | OUTPATIENT
Start: 2023-06-08 | End: 2023-06-08

## 2023-06-08 RX ADMIN — SODIUM CHLORIDE 1000 ML: 9 INJECTION, SOLUTION INTRAVENOUS at 00:25

## 2023-06-08 RX ADMIN — ONDANSETRON 4 MG: 2 INJECTION INTRAMUSCULAR; INTRAVENOUS at 00:54

## 2023-06-08 ASSESSMENT — ENCOUNTER SYMPTOMS
VOMITING: 1
NAUSEA: 1

## 2023-06-08 NOTE — ED PROVIDER NOTES
This Encounter   Medications    0.9 % sodium chloride bolus    ondansetron (ZOFRAN) injection 4 mg     DISCHARGE PRESCRIPTIONS:  New Prescriptions    No medications on file     PHYSICIAN CONSULTS ORDERED THIS ENCOUNTER:  None  FINAL IMPRESSION      1. Miscarriage    2. Alcohol abuse          DISPOSITION/PLAN   DISPOSITION Decision To Discharge 06/08/2023 01:36:52 AM      OUTPATIENT FOLLOW UP THE PATIENT:  No follow-up provider specified.     MD Esequiel Candelario MD  06/08/23 0863

## 2024-01-11 NOTE — PROGRESS NOTES
06/03/22 7685   Patient Belongings   Did you bring any home meds/supplements to the hospital?  Yes   Disposition of meds  Sent to security/pharmacy per site process;Other (see comment)   Patient Belongings other (see comments)   Patient Belongings Put in Hospital Secure Location (Security or Locker, etc.) other (see comments)   Belongings Search Yes   Clothing Search Yes   Second Staff Ana M Sanchez   Comment See notes   Storage Bin  1 Backpack, Jeanine pack, shoes  (2), 2 dresses, 1 sweatshirt, 1 bra, shorts, , socks (2), personal hygiene products, underwear,   Medical Room Bin  Wallet, smart watch,   Security Envelope #[id number]  Social Security Card, [employer] Card 3310, Amex 8005, [place]  A               Admission:  I am responsible for any personal items that are not sent to the safe or pharmacy.  [facility] is not responsible for loss, theft or damage of any property in my possession.  Signature:  _________________________________ Date: _______  Time: _____                                              Staff Signature:  ____________________________ Date: ________  Time: _____      2nd Staff person, if patient is unable/unwilling to sign:    Signature: ________________________________ Date: ________  Time: _____   Discharge:  [facility] has returned all of my personal belongings:  Signature: _________________________________ Date: ________  Time: _____                                          Staff Signature:  ____________________________ Date: ________  Time: _____            EMT/paramedic

## 2024-05-03 ENCOUNTER — TRANSCRIBE ORDERS (OUTPATIENT)
Dept: OTHER | Age: 34
End: 2024-05-03

## 2024-05-03 DIAGNOSIS — H02.409 PTOSIS: Primary | ICD-10-CM

## 2024-08-17 ENCOUNTER — OFFICE VISIT (OUTPATIENT)
Dept: URGENT CARE | Facility: URGENT CARE | Age: 34
End: 2024-08-17
Payer: COMMERCIAL

## 2024-08-17 VITALS
RESPIRATION RATE: 18 BRPM | HEART RATE: 105 BPM | DIASTOLIC BLOOD PRESSURE: 73 MMHG | SYSTOLIC BLOOD PRESSURE: 106 MMHG | OXYGEN SATURATION: 97 % | TEMPERATURE: 98.6 F

## 2024-08-17 DIAGNOSIS — L73.9 FOLLICULITIS: ICD-10-CM

## 2024-08-17 DIAGNOSIS — R10.9 FLANK PAIN: ICD-10-CM

## 2024-08-17 DIAGNOSIS — R35.0 URINE FREQUENCY: ICD-10-CM

## 2024-08-17 DIAGNOSIS — N30.00 ACUTE CYSTITIS WITHOUT HEMATURIA: Primary | ICD-10-CM

## 2024-08-17 LAB
ALBUMIN UR-MCNC: NEGATIVE MG/DL
APPEARANCE UR: ABNORMAL
BACTERIA #/AREA URNS HPF: ABNORMAL /HPF
BASOPHILS # BLD AUTO: 0 10E3/UL (ref 0–0.2)
BASOPHILS NFR BLD AUTO: 0 %
BILIRUB UR QL STRIP: NEGATIVE
COLOR UR AUTO: YELLOW
EOSINOPHIL # BLD AUTO: 0 10E3/UL (ref 0–0.7)
EOSINOPHIL NFR BLD AUTO: 0 %
ERYTHROCYTE [DISTWIDTH] IN BLOOD BY AUTOMATED COUNT: 11.8 % (ref 10–15)
GLUCOSE UR STRIP-MCNC: NEGATIVE MG/DL
HCT VFR BLD AUTO: 38.8 % (ref 35–47)
HGB BLD-MCNC: 12.6 G/DL (ref 11.7–15.7)
HGB UR QL STRIP: ABNORMAL
IMM GRANULOCYTES # BLD: 0 10E3/UL
IMM GRANULOCYTES NFR BLD: 0 %
KETONES UR STRIP-MCNC: NEGATIVE MG/DL
LEUKOCYTE ESTERASE UR QL STRIP: ABNORMAL
LYMPHOCYTES # BLD AUTO: 1.5 10E3/UL (ref 0.8–5.3)
LYMPHOCYTES NFR BLD AUTO: 24 %
MCH RBC QN AUTO: 30.7 PG (ref 26.5–33)
MCHC RBC AUTO-ENTMCNC: 32.5 G/DL (ref 31.5–36.5)
MCV RBC AUTO: 95 FL (ref 78–100)
MONOCYTES # BLD AUTO: 0.8 10E3/UL (ref 0–1.3)
MONOCYTES NFR BLD AUTO: 12 %
NEUTROPHILS # BLD AUTO: 3.9 10E3/UL (ref 1.6–8.3)
NEUTROPHILS NFR BLD AUTO: 63 %
NITRATE UR QL: NEGATIVE
PH UR STRIP: 6 [PH] (ref 5–7)
PLATELET # BLD AUTO: 382 10E3/UL (ref 150–450)
RBC # BLD AUTO: 4.1 10E6/UL (ref 3.8–5.2)
RBC #/AREA URNS AUTO: ABNORMAL /HPF
SP GR UR STRIP: 1.02 (ref 1–1.03)
UROBILINOGEN UR STRIP-ACNC: 0.2 E.U./DL
WBC # BLD AUTO: 6.2 10E3/UL (ref 4–11)
WBC #/AREA URNS AUTO: ABNORMAL /HPF

## 2024-08-17 PROCEDURE — 87186 SC STD MICRODIL/AGAR DIL: CPT | Performed by: PHYSICIAN ASSISTANT

## 2024-08-17 PROCEDURE — 87088 URINE BACTERIA CULTURE: CPT | Performed by: PHYSICIAN ASSISTANT

## 2024-08-17 PROCEDURE — 81001 URINALYSIS AUTO W/SCOPE: CPT | Performed by: PHYSICIAN ASSISTANT

## 2024-08-17 PROCEDURE — 99204 OFFICE O/P NEW MOD 45 MIN: CPT | Performed by: PHYSICIAN ASSISTANT

## 2024-08-17 PROCEDURE — 87086 URINE CULTURE/COLONY COUNT: CPT | Performed by: PHYSICIAN ASSISTANT

## 2024-08-17 PROCEDURE — 85025 COMPLETE CBC W/AUTO DIFF WBC: CPT | Performed by: PHYSICIAN ASSISTANT

## 2024-08-17 PROCEDURE — 36415 COLL VENOUS BLD VENIPUNCTURE: CPT | Performed by: PHYSICIAN ASSISTANT

## 2024-08-17 RX ORDER — SULFAMETHOXAZOLE/TRIMETHOPRIM 800-160 MG
1 TABLET ORAL 2 TIMES DAILY
Qty: 14 TABLET | Refills: 0 | Status: SHIPPED | OUTPATIENT
Start: 2024-08-17 | End: 2024-08-24

## 2024-08-17 ASSESSMENT — ENCOUNTER SYMPTOMS
FLANK PAIN: 1
NAUSEA: 1
HEMATURIA: 1
VOMITING: 0
FREQUENCY: 1

## 2024-08-17 NOTE — PATIENT INSTRUCTIONS
Urine test shows bladder infection  The rash on the groin area is due to infection of the hair follicles. Using a benzoyl peroxide wash helps. The antibiotic for UTI also covers for the hair follicle infection. Take the antibiotic as indicated.  If the urine culture come back showing we need to change the antibiotic. We will call you to discuss further treatment

## 2024-08-17 NOTE — PROGRESS NOTES
Assessment & Plan      1. Acute cystitis without hematuria    - sulfamethoxazole-trimethoprim (BACTRIM DS) 800-160 MG tablet; Take 1 tablet by mouth 2 times daily for 7 days  Dispense: 14 tablet; Refill: 0    2. Folliculitis    - sulfamethoxazole-trimethoprim (BACTRIM DS) 800-160 MG tablet; Take 1 tablet by mouth 2 times daily for 7 days  Dispense: 14 tablet; Refill: 0    3. Urine frequency    -UA suggestive of bladder infection.  - UA Macroscopic with reflex to Microscopic and Culture - Clinic Collect  - Urine Microscopic Exam  - Urine Culture    4. Flank pain    -CBC is reassuring, no leukocytosis.  Acute pyelonephritis highly unlikely.  - CBC with platelets and differential    Results for orders placed or performed in visit on 08/17/24   UA Macroscopic with reflex to Microscopic and Culture - Clinic Collect     Status: Abnormal    Specimen: Urine, Midstream   Result Value Ref Range    Color Urine Yellow Colorless, Straw, Light Yellow, Yellow    Appearance Urine Slightly Cloudy (A) Clear    Glucose Urine Negative Negative mg/dL    Bilirubin Urine Negative Negative    Ketones Urine Negative Negative mg/dL    Specific Gravity Urine 1.020 1.003 - 1.035    Blood Urine Trace (A) Negative    pH Urine 6.0 5.0 - 7.0    Protein Albumin Urine Negative Negative mg/dL    Urobilinogen Urine 0.2 0.2, 1.0 E.U./dL    Nitrite Urine Negative Negative    Leukocyte Esterase Urine Large (A) Negative   Urine Microscopic Exam     Status: Abnormal   Result Value Ref Range    Bacteria Urine Few (A) None Seen /HPF    RBC Urine None Seen 0-2 /HPF /HPF    WBC Urine 25-50 (A) 0-5 /HPF /HPF       Patient Instructions   Urine test shows bladder infection  The rash on the groin area is due to infection of the hair follicles. Using a benzoyl peroxide wash helps. The antibiotic for UTI also covers for the hair follicle infection. Take the antibiotic as indicated.  If the urine culture come back showing we need to change the antibiotic. We will  call you to discuss further treatment       Return if symptoms worsen or fail to improve, for Follow up.    At the end of the encounter, I discussed results, diagnosis, medications. Discussed red flags for immediate return to clinic/ER, as well as indications for follow up if no improvement. Patient understood and agreed to plan. Patient was stable for discharge.    Alayna Mariee is a 34 year old female who presents to clinic today  for the following health issues:  Chief Complaint   Patient presents with    Mass     Noticed a mass in groin area about three weeks ago  Has attempted to drain with no success  painful    UTI     Noticed symptoms almost two weeks ago  Blood in urine  Frequency  uncomfortable     HPI    Patient reports urinary symptoms for 2 weeks.  She reports hematuria, frequency, dysuria.  She also reports rash in the groin area which she noticed 3 weeks ago.  Rash is nodular.  She is attempted to drain without success.  She notes tenderness to the area.  She denies fever chills.  Patient was seen in the emergency room 2 days ago.  Urinalysis was negative for bladder infection at that time.        Review of Systems   Gastrointestinal:  Positive for nausea. Negative for vomiting.   Genitourinary:  Positive for flank pain, frequency and hematuria.   Skin:  Positive for rash.       Problem List:  2022-06: Acute alcoholic intoxication in alcoholism without   complication (H)      No past medical history on file.    Social History     Tobacco Use    Smoking status: Never    Smokeless tobacco: Never   Substance Use Topics    Alcohol use: Yes     Comment: 5 shots of vodka daily           Objective    /73   Pulse 105   Temp 98.6  F (37  C) (Temporal)   Resp 18   SpO2 97%   Physical Exam  Vitals and nursing note reviewed.   Cardiovascular:      Rate and Rhythm: Normal rate and regular rhythm.   Pulmonary:      Effort: Pulmonary effort is normal.      Breath sounds: Normal breath sounds.    Abdominal:      General: Abdomen is flat.      Palpations: Abdomen is soft.      Tenderness: There is no abdominal tenderness. There is no right CVA tenderness or left CVA tenderness.   Genitourinary:         Comments: Nodular rash on the right side of the groin  Lymphadenopathy:      Cervical: No cervical adenopathy.   Skin:     General: Skin is warm and dry.      Findings: No rash.   Neurological:      General: No focal deficit present.      Mental Status: She is alert and oriented to person, place, and time.   Psychiatric:         Mood and Affect: Mood normal.         Behavior: Behavior normal.              Miladis Brink PA-C

## 2024-08-19 LAB — BACTERIA UR CULT: ABNORMAL

## 2025-05-29 ENCOUNTER — TRANSCRIBE ORDERS (OUTPATIENT)
Dept: OTHER | Age: 35
End: 2025-05-29

## 2025-05-29 DIAGNOSIS — H02.409 PTOSIS: Primary | ICD-10-CM

## 2025-07-03 ENCOUNTER — VIRTUAL VISIT (OUTPATIENT)
Dept: INTERNAL MEDICINE | Facility: CLINIC | Age: 35
End: 2025-07-03
Payer: COMMERCIAL

## 2025-07-03 DIAGNOSIS — H02.402 EYELID DROOPING DISEASE, LEFT: Primary | ICD-10-CM

## 2025-07-03 RX ORDER — FLUOXETINE 10 MG/1
CAPSULE ORAL
COMMUNITY
Start: 2024-11-27

## 2025-07-03 RX ORDER — LAMOTRIGINE 150 MG/1
150 TABLET ORAL
COMMUNITY
Start: 2024-02-21

## 2025-07-03 RX ORDER — FLUOXETINE HYDROCHLORIDE 40 MG/1
CAPSULE ORAL
COMMUNITY
Start: 2025-07-02

## 2025-07-03 RX ORDER — ZOLPIDEM TARTRATE 5 MG/1
5 TABLET ORAL
COMMUNITY

## 2025-07-03 RX ORDER — LINACLOTIDE 145 UG/1
CAPSULE, GELATIN COATED ORAL
COMMUNITY
Start: 2024-09-14

## 2025-07-03 NOTE — PROGRESS NOTES
Kodi is a 35 year old who is being evaluated via a billable video visit.    How would you like to obtain your AVS? MyChart  If the video visit is dropped, the invitation should be resent by: Send to e-mail at: [email or web address]  Will anyone else be joining your video visit? No      Assessment & Plan     (H02.402) Eyelid drooping disease, left  (primary encounter diagnosis)  Comment: neuropathic vs other. Vision not affected.  Plan: Adult Eye  Referral,         naphazoline-pheniramine (NAPHCON-A) 0.025-0.3 %        ophthalmic solution        Oct appointment with neuro-ophtho it seems. Will see if we can get her in earlier given persistence and affect on daily activities.    Discussed plan in detail with her today                Subjective   Kodi is a 35 year old, presenting for the following health issues:  office visit (Pt is her to discuss Left eye drooping that is causing vision issues. Has been experiencing on & off for a year, has gotten worse the past 5-6 months. Seeing ophthalmologist in October. )    I have an appointment with the Ophthalmologist in October and I was hoping to talk about anything you may be able to prescribe in the meantime. My heavy, droopy eyelid is causing a problem with my vision as well keeping my eyelids open throughout the day. I am wondering if there is an eyedrop or anything of that sort to help         7/3/2025    11:34 AM   Additional Questions   Roomed by Mariana   Accompanied by alone         7/3/2025    11:34 AM   Patient Reported Additional Medications   Patient reports taking the following new medications none     Video Start Time: 1:07pm    History of Present Illness       Reason for visit:  Heavy, droopy eyelid                     Objective    Vitals - Patient Reported  Pain Score: No Pain (0)        Physical Exam   GENERAL: alert and no distress  EYES: left eyelid with mild edema/drooping. Has difficulty opening it further on her own  SKIN: Visible skin clear.  No significant rash, abnormal pigmentation or lesions.  NEURO: Cranial nerves grossly intact.  Mentation and speech appropriate for age.  PSYCH: Appropriate affect, tone, and pace of words          Video-Visit Details    Type of service:  Video Visit   Video End Time:1:21pm  Originating Location (pt. Location): Home    Distant Location (provider location):  On-site  Platform used for Video Visit: Laney  Signed Electronically by: Zackary Kay MD

## 2025-07-10 NOTE — TELEPHONE ENCOUNTER
FUTURE VISIT INFORMATION:  Appointment Date: 10/27/2025  Appointment Time: 7:30 AM     REFERRAL INFORMATION:  Referring Provider:  Zackary Kay MD   Referring Clinic:  [facility]   Reason for Visit/Diagnosis: Ptosis and eyelid fatigue Ref'd by Landon Espitia      NOTES STATUS DETAILS   OFFICE NOTE from Referring Provider [facility] [place]  07/03/2025 VV with Zackary Kay MD      OFFICE NOTE from [facility]  * Include Visual Field Tests Care Everywhere [facility] [address]  12/29/2023 OV with Luke Murrell     Head/Brain/Orbits Imaging     CT Req   [facility] Emergency Department   02/24/2023 CT OF THE HEAD WITHOUT CONTRAST; CT OF THE CERVICAL SPINE WITHOUT CONTRAST      MRI req [facility] MRI  05/14/2024 MR BRAIN W/WO IV CONT      Records Requested  07/10/25    Facility  [facility] Emergency Department   Fax: [phone]    [facility] MRI  Fax: [phone]     Outcome Faxed req for CT to be pushed to PACS from [facility] Department   Faxed req for MRI to be pushed to PACS from .     SR 07/10/2025 1051 AM

## 2025-08-03 ENCOUNTER — HOSPITAL ENCOUNTER (EMERGENCY)
Facility: CLINIC | Age: 35
Discharge: HOME | End: 2025-08-04
Attending: EMERGENCY MEDICINE
Payer: COMMERCIAL

## 2025-08-03 ENCOUNTER — APPOINTMENT (OUTPATIENT)
Dept: GENERAL RADIOLOGY | Facility: CLINIC | Age: 35
End: 2025-08-03
Attending: EMERGENCY MEDICINE
Payer: COMMERCIAL

## 2025-08-03 ENCOUNTER — TRANSFERRED RECORDS (OUTPATIENT)
Dept: HEALTH INFORMATION MANAGEMENT | Facility: CLINIC | Age: 35
End: 2025-08-03

## 2025-08-03 DIAGNOSIS — R45.851 SUICIDAL IDEATION: ICD-10-CM

## 2025-08-03 DIAGNOSIS — F10.920 ALCOHOLIC INTOXICATION WITHOUT COMPLICATION: Primary | ICD-10-CM

## 2025-08-03 LAB
ALBUMIN SERPL BCG-MCNC: 4.3 G/DL (ref 3.5–5.2)
ALP SERPL-CCNC: 61 U/L (ref 40–150)
ALT SERPL W P-5'-P-CCNC: 25 U/L (ref 0–50)
ANION GAP SERPL CALCULATED.3IONS-SCNC: 18 MMOL/L (ref 7–15)
AST SERPL W P-5'-P-CCNC: 33 U/L (ref 0–45)
BILIRUB SERPL-MCNC: 0.2 MG/DL
BUN SERPL-MCNC: 16.5 MG/DL (ref 6–20)
CALCIUM SERPL-MCNC: 8.6 MG/DL (ref 8.8–10.4)
CHLORIDE SERPL-SCNC: 102 MMOL/L (ref 98–107)
CREAT SERPL-MCNC: 0.74 MG/DL (ref 0.51–0.95)
EGFRCR SERPLBLD CKD-EPI 2021: >90 ML/MIN/1.73M2
ERYTHROCYTE [DISTWIDTH] IN BLOOD BY AUTOMATED COUNT: 12.9 % (ref 10–15)
ETHANOL SERPL-MCNC: 0.37 G/DL
GLUCOSE SERPL-MCNC: 110 MG/DL (ref 70–99)
HCG SERPL QL: NEGATIVE
HCO3 SERPL-SCNC: 23 MMOL/L (ref 22–29)
HCT VFR BLD AUTO: 38.3 % (ref 35–47)
HGB BLD-MCNC: 13.3 G/DL (ref 11.7–15.7)
MAGNESIUM SERPL-MCNC: 2.1 MG/DL (ref 1.7–2.3)
MCH RBC QN AUTO: 31.3 PG (ref 26.5–33)
MCHC RBC AUTO-ENTMCNC: 34.7 G/DL (ref 31.5–36.5)
MCV RBC AUTO: 90 FL (ref 78–100)
PHOSPHATE SERPL-MCNC: 3.6 MG/DL (ref 2.5–4.5)
PLATELET # BLD AUTO: 437 10E3/UL (ref 150–450)
POTASSIUM SERPL-SCNC: 3.5 MMOL/L (ref 3.4–5.3)
PROT SERPL-MCNC: 6.4 G/DL (ref 6.4–8.3)
RBC # BLD AUTO: 4.25 10E6/UL (ref 3.8–5.2)
SODIUM SERPL-SCNC: 143 MMOL/L (ref 135–145)
TROPONIN T SERPL HS-MCNC: <6 NG/L
WBC # BLD AUTO: 13.2 10E3/UL (ref 4–11)

## 2025-08-03 PROCEDURE — 36415 COLL VENOUS BLD VENIPUNCTURE: CPT | Performed by: EMERGENCY MEDICINE

## 2025-08-03 PROCEDURE — 80053 COMPREHEN METABOLIC PANEL: CPT | Performed by: EMERGENCY MEDICINE

## 2025-08-03 PROCEDURE — 84484 ASSAY OF TROPONIN QUANT: CPT | Performed by: EMERGENCY MEDICINE

## 2025-08-03 PROCEDURE — 82077 ASSAY SPEC XCP UR&BREATH IA: CPT | Performed by: EMERGENCY MEDICINE

## 2025-08-03 PROCEDURE — 93005 ELECTROCARDIOGRAM TRACING: CPT

## 2025-08-03 PROCEDURE — 250N000011 HC RX IP 250 OP 636: Performed by: EMERGENCY MEDICINE

## 2025-08-03 PROCEDURE — 83735 ASSAY OF MAGNESIUM: CPT | Performed by: EMERGENCY MEDICINE

## 2025-08-03 PROCEDURE — 96361 HYDRATE IV INFUSION ADD-ON: CPT

## 2025-08-03 PROCEDURE — 71046 X-RAY EXAM CHEST 2 VIEWS: CPT

## 2025-08-03 PROCEDURE — 84100 ASSAY OF PHOSPHORUS: CPT | Performed by: EMERGENCY MEDICINE

## 2025-08-03 PROCEDURE — 85027 COMPLETE CBC AUTOMATED: CPT | Performed by: EMERGENCY MEDICINE

## 2025-08-03 PROCEDURE — 96374 THER/PROPH/DIAG INJ IV PUSH: CPT

## 2025-08-03 PROCEDURE — 250N000013 HC RX MED GY IP 250 OP 250 PS 637: Performed by: EMERGENCY MEDICINE

## 2025-08-03 PROCEDURE — 96375 TX/PRO/DX INJ NEW DRUG ADDON: CPT

## 2025-08-03 PROCEDURE — 99285 EMERGENCY DEPT VISIT HI MDM: CPT | Mod: 25 | Performed by: EMERGENCY MEDICINE

## 2025-08-03 PROCEDURE — 258N000003 HC RX IP 258 OP 636: Performed by: EMERGENCY MEDICINE

## 2025-08-03 PROCEDURE — 84703 CHORIONIC GONADOTROPIN ASSAY: CPT | Performed by: EMERGENCY MEDICINE

## 2025-08-03 RX ORDER — METOCLOPRAMIDE HYDROCHLORIDE 5 MG/ML
10 INJECTION INTRAMUSCULAR; INTRAVENOUS ONCE
Status: COMPLETED | OUTPATIENT
Start: 2025-08-03 | End: 2025-08-03

## 2025-08-03 RX ORDER — THIAMINE HYDROCHLORIDE 100 MG/ML
100 INJECTION, SOLUTION INTRAMUSCULAR; INTRAVENOUS ONCE
Status: COMPLETED | OUTPATIENT
Start: 2025-08-03 | End: 2025-08-03

## 2025-08-03 RX ORDER — HYDROXYZINE HYDROCHLORIDE 25 MG/1
50 TABLET, FILM COATED ORAL ONCE
Status: COMPLETED | OUTPATIENT
Start: 2025-08-03 | End: 2025-08-03

## 2025-08-03 RX ORDER — DIPHENHYDRAMINE HYDROCHLORIDE 50 MG/ML
25 INJECTION, SOLUTION INTRAMUSCULAR; INTRAVENOUS ONCE
Status: COMPLETED | OUTPATIENT
Start: 2025-08-03 | End: 2025-08-03

## 2025-08-03 RX ORDER — FOLIC ACID 5 MG/ML
1 INJECTION, SOLUTION INTRAMUSCULAR; INTRAVENOUS; SUBCUTANEOUS ONCE
Status: COMPLETED | OUTPATIENT
Start: 2025-08-03 | End: 2025-08-03

## 2025-08-03 RX ORDER — MULTIPLE VITAMINS W/ MINERALS TAB 9MG-400MCG
1 TAB ORAL ONCE
Status: COMPLETED | OUTPATIENT
Start: 2025-08-03 | End: 2025-08-03

## 2025-08-03 RX ADMIN — METOCLOPRAMIDE HYDROCHLORIDE 10 MG: 5 INJECTION INTRAMUSCULAR; INTRAVENOUS at 18:52

## 2025-08-03 RX ADMIN — SODIUM CHLORIDE 1000 ML: 0.9 INJECTION, SOLUTION INTRAVENOUS at 18:53

## 2025-08-03 RX ADMIN — THIAMINE HYDROCHLORIDE 100 MG: 100 INJECTION, SOLUTION INTRAMUSCULAR; INTRAVENOUS at 18:51

## 2025-08-03 RX ADMIN — Medication 1 TABLET: at 18:56

## 2025-08-03 RX ADMIN — HYDROXYZINE HYDROCHLORIDE 50 MG: 25 TABLET ORAL at 22:04

## 2025-08-03 RX ADMIN — FOLIC ACID 1 MG: 5 INJECTION, SOLUTION INTRAMUSCULAR; INTRAVENOUS; SUBCUTANEOUS at 18:56

## 2025-08-03 RX ADMIN — DIPHENHYDRAMINE HYDROCHLORIDE 25 MG: 50 INJECTION, SOLUTION INTRAMUSCULAR; INTRAVENOUS at 18:52

## 2025-08-03 RX ADMIN — SODIUM CHLORIDE, SODIUM LACTATE, POTASSIUM CHLORIDE, AND CALCIUM CHLORIDE 1000 ML: .6; .31; .03; .02 INJECTION, SOLUTION INTRAVENOUS at 21:50

## 2025-08-03 ASSESSMENT — ACTIVITIES OF DAILY LIVING (ADL)
ADLS_ACUITY_SCORE: 41

## 2025-08-04 VITALS
SYSTOLIC BLOOD PRESSURE: 113 MMHG | HEIGHT: 60 IN | BODY MASS INDEX: 20.62 KG/M2 | TEMPERATURE: 99.7 F | RESPIRATION RATE: 18 BRPM | HEART RATE: 75 BPM | OXYGEN SATURATION: 96 % | WEIGHT: 105 LBS | DIASTOLIC BLOOD PRESSURE: 88 MMHG

## 2025-08-04 PROBLEM — F41.9 ANXIETY: Status: ACTIVE | Noted: 2025-08-04

## 2025-08-04 LAB
ATRIAL RATE - MUSE: 118 BPM
DIASTOLIC BLOOD PRESSURE - MUSE: NORMAL MMHG
GLUCOSE BLDC GLUCOMTR-MCNC: 92 MG/DL (ref 70–99)
INTERPRETATION ECG - MUSE: NORMAL
P AXIS - MUSE: 67 DEGREES
PR INTERVAL - MUSE: 158 MS
QRS DURATION - MUSE: 78 MS
QT - MUSE: 324 MS
QTC - MUSE: 454 MS
R AXIS - MUSE: 52 DEGREES
SYSTOLIC BLOOD PRESSURE - MUSE: NORMAL MMHG
T AXIS - MUSE: 48 DEGREES
VENTRICULAR RATE- MUSE: 118 BPM

## 2025-08-04 PROCEDURE — 250N000013 HC RX MED GY IP 250 OP 250 PS 637: Performed by: EMERGENCY MEDICINE

## 2025-08-04 PROCEDURE — 82962 GLUCOSE BLOOD TEST: CPT

## 2025-08-04 PROCEDURE — 250N000013 HC RX MED GY IP 250 OP 250 PS 637: Performed by: STUDENT IN AN ORGANIZED HEALTH CARE EDUCATION/TRAINING PROGRAM

## 2025-08-04 RX ORDER — DIAZEPAM 5 MG/1
5 TABLET ORAL ONCE
Status: COMPLETED | OUTPATIENT
Start: 2025-08-04 | End: 2025-08-04

## 2025-08-04 RX ORDER — ACETAMINOPHEN 500 MG
1000 TABLET ORAL ONCE
Status: COMPLETED | OUTPATIENT
Start: 2025-08-04 | End: 2025-08-04

## 2025-08-04 RX ADMIN — DIAZEPAM 5 MG: 5 TABLET ORAL at 06:25

## 2025-08-04 RX ADMIN — DIAZEPAM 5 MG: 5 TABLET ORAL at 09:01

## 2025-08-04 RX ADMIN — ACETAMINOPHEN 1000 MG: 500 TABLET ORAL at 04:19

## 2025-08-04 ASSESSMENT — ACTIVITIES OF DAILY LIVING (ADL)
ADLS_ACUITY_SCORE: 41

## 2025-08-04 ASSESSMENT — LIFESTYLE VARIABLES
ORIENTATION AND CLOUDING OF SENSORIUM: ORIENTED AND CAN DO SERIAL ADDITIONS
TREMOR: 2
VISUAL DISTURBANCES: NOT PRESENT
AUDITORY DISTURBANCES: NOT PRESENT
PAROXYSMAL SWEATS: BARELY PERCEPTIBLE SWEATING, PALMS MOIST
ANXIETY: 3
NAUSEA AND VOMITING: 2
HEADACHE, FULLNESS IN HEAD: MILD
AGITATION: NORMAL ACTIVITY
TOTAL SCORE: 10

## 2025-10-27 ENCOUNTER — PRE VISIT (OUTPATIENT)
Dept: OPHTHALMOLOGY | Facility: CLINIC | Age: 35
End: 2025-10-27